# Patient Record
Sex: MALE | Employment: FULL TIME | URBAN - METROPOLITAN AREA
[De-identification: names, ages, dates, MRNs, and addresses within clinical notes are randomized per-mention and may not be internally consistent; named-entity substitution may affect disease eponyms.]

---

## 2017-04-19 ENCOUNTER — HOSPITAL ENCOUNTER (OUTPATIENT)
Dept: PHYSICAL THERAPY | Age: 62
Discharge: HOME | End: 2017-04-19
Payer: COMMERCIAL

## 2017-04-19 PROCEDURE — 97110 THERAPEUTIC EXERCISES: CPT

## 2017-04-19 PROCEDURE — 97162 PT EVAL MOD COMPLEX 30 MIN: CPT

## 2017-04-19 NOTE — PROGRESS NOTES
PT DAILY TREATMENT NOTE     Patient Name: Jensen Herzog. Date:2017  : 1955  [x]  Patient  Verified  Payor: BLUE CROSS / Plan: [employer] [facility] [place] / Product Type: PPO /    In time:10:05  Out time:***  Total Treatment Time (min): ***  Visit #: 1 of 8    Treatment Area: Low back pain [M54.5]    SUBJECTIVE  Pain Level (0-10 scale): 2/10  Any medication changes, allergies to medications, adverse drug reactions, diagnosis change, or new procedure performed?: [x] No    [] Yes (see summary sheet for update)  Subjective functional status/changes:   [] No changes reported  The patient has a chief complaint of lumbar pain that does extend into both hips. OBJECTIVE  *** min []Eval                  []Re-Eval       *** min Therapeutic Exercise:  [] See flow sheet :   Rationale: {BSHSI IMMOTION THER EX:01297} to improve the patients ability to ***    *** min Therapeutic Activity:  []  See flow sheet :   Rationale: {BSHSI IMMOTION THER EX:49533}  to improve the patients ability to ***     *** min Neuromuscular Re-education:  []  See flow sheet :   Rationale: {BSHSI IMMOTION THER EX:39626}  to improve the patients ability to ***    *** min Manual Therapy:  ***   Rationale: {BSHSI IMMOTION MANUAL THERAPY:37914} to ***    *** min Gait Training:  ___ feet with ___ device on level surfaces with ___ level of assist   Rationale:           With   [] TE   [] TA   [] neuro   [] other: Patient Education: [x] Review HEP    [] Progressed/Changed HEP based on:   [] positioning   [] body mechanics   [] transfers   [] heat/ice application    [] other:      Other Objective/Functional Measures: ***     Pain Level (0-10 scale) post treatment: ***    ASSESSMENT/Changes in Function: ***    Patient will continue to benefit from skilled PT services to modify and progress therapeutic interventions, address functional mobility deficits, address ROM deficits, address strength deficits, analyze and address soft tissue restrictions, analyze and cue movement patterns, analyze and modify body mechanics/ergonomics, assess and modify postural abnormalities and instruct in home and community integration to attain remaining goals. []  See Plan of Care  []  See progress note/recertification  []  See Discharge Summary         Progress towards goals / Updated goals:  Short Term Goals: To be accomplished in 2 weeks:  1. The patient will be independent and compliant with HEP to maximize therapeutic benefit. 2. The patient will improve 90/90 HS flexibility to 35 to reduce stress on lumbar spine. Long Term Goals: To be accomplished in 4 weeks:  1. The patient will improve FOTO score to 70 to maximize quality of life. 2. The patient will improve strength of hip ABD/EXT to 4+/5 MMT to maximize stability during standing. 3. The patient will display negative Silke Brazen test to reduce ambulation ease.   4. The patient display single leg bridge full range without hip drop for improved rotary stability.       PLAN  []  Upgrade activities as tolerated     []  Continue plan of care  []  Update interventions per flow sheet       []  Discharge due to:_  []  Other:_      Anaid Fitzpatrick, PT 4/19/2017  1:03 PM    Future Appointments  Date Time Provider Abrahan Maldonado   4/20/2017 10:00 AM August Bliss [facility]   4/25/2017 9:30 AM [facility]   4/27/2017 10:30 AM Shanika Enriquez [facility]   5/2/2017 8:30 AM [facility]   5/4/2017 9:30 AM [facility]   5/9/2017 9:00 AM [facility]patrick[facility]   5/11/2017 9:00 AM [facility]

## 2017-04-19 NOTE — PROGRESS NOTES
PT DAILY TREATMENT NOTE     Patient Name: Verito Snyder. Date:2017  : 1955  [x]  Patient  Verified  Payor: BLUE CROSS / Plan:  [facility] / Product Type: PPO /    In time:10:05  Out time:10:50  Total Treatment Time (min): 45  Visit #: 1 of 8    Treatment Area: Low back pain [M54.5]    SUBJECTIVE  Pain Level (0-10 scale): 2/10  Any medication changes, allergies to medications, adverse drug reactions, diagnosis change, or new procedure performed?: [x] No    [] Yes (see summary sheet for update)  Subjective functional status/changes:   [] No changes reported  The patient states that he has a chief complaint lumbar pain that limits standing ability.      OBJECTIVE    Modality rationale:  to improve the patients ability to    Min Type Additional Details    [] Estim:  []Unatt       []IFC  []Premod                        []Other:  []w/ice   []w/heat  Position:  Location:    [] Estim: []Att    []TENS instruct  []NMES                    []Other:  []w/US   []w/ice   []w/heat  Position:  Location:    []  Traction: [] Cervical       []Lumbar                       [] Prone          []Supine                       []Intermittent   []Continuous Lbs:  [] before manual  [] after manual    []  Ultrasound: []Continuous   [] Pulsed                           []1MHz   []3MHz W/cm2:  Location:    []  Iontophoresis with dexamethasone         Location: [] Take home patch   [] In clinic    []  Ice     []  heat  []  Ice massage  []  Laser   []  Anodyne Position:  Location:    []  Laser with stim  []  Other:  Position:  Location:    []  Vasopneumatic Device Pressure:       [] lo [] med [] hi   Temperature: [] lo [] med [] hi   [] Skin assessment post-treatment:  []intact []redness- no adverse reaction    []redness  adverse reaction:     35 min [x]Eval                  []Re-Eval       10 min Therapeutic Exercise:  [x] See flow sheet :   Rationale: increase ROM and increase strength to improve the patients ability to improve ADL ease. With   [] TE   [] TA   [] neuro   [] other: Patient Education: [x] Review HEP    [] Progressed/Changed HEP based on:   [] positioning   [] body mechanics   [] transfers   [] heat/ice application    [] other:      Other Objective/Functional Measures: See IE     Pain Level (0-10 scale) post treatment: 5/10    ASSESSMENT/Changes in Function: See POC. Patient will continue to benefit from skilled PT services to modify and progress therapeutic interventions, address functional mobility deficits, address ROM deficits, address strength deficits, analyze and address soft tissue restrictions, analyze and cue movement patterns, analyze and modify body mechanics/ergonomics, assess and modify postural abnormalities and instruct in home and community integration to attain remaining goals. [x]  See Plan of Care  []  See progress note/recertification  []  See Discharge Summary         Progress towards goals / Updated goals:  Short Term Goals: To be accomplished in 2 weeks:  1. The patient will be independent and compliant with HEP to maximize therapeutic benefit. 2. The patient will improve 90/90 HS flexibility to 35 to reduce stress on lumbar spine. Long Term Goals: To be accomplished in 4 weeks:  1. The patient will improve FOTO score to 70 to maximize quality of life. 2. The patient will improve strength of hip ABD/EXT to 4+/5 MMT to maximize stability during standing. 3. The patient will display negative Rika Espinoza test to reduce ambulation ease.   4. The patient display single leg bridge full range without hip drop for improved rotary stability.     PLAN  []  Upgrade activities as tolerated     [x]  Continue plan of care  []  Update interventions per flow sheet       []  Discharge due to:_  []  Other:_      Marianne Garcia PT 4/19/2017  1:19 PM    Future Appointments  Date Time Provider Abrahan Maldonado   4/20/2017 10:00 AM [address] [place] [address]   4/25/2017 9:30 AM Richard Will Linda, PT [facility]   4/27/2017 10:30 AM Lani Leedey [facility]   5/2/2017 8:30 AM Jasmin Mcfarlane, PT [facility]   5/4/2017 9:30 AM Lani Leedey [facility]   5/9/2017 9:00 AM Jasmin Mcfarlane PT [facility]   5/11/2017 9:00 AM Lani Leedey [facility]

## 2017-04-19 NOTE — PROGRESS NOTES
[facility]  [address]  [place], [address]  [phone] fax    Plan of Care/ Statement of Necessity for Physical Therapy Services    Patient name: Francoise Stokes. 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 The Plan of Care and following information is based on the information from the initial evaluation. Assessment/ key information: The patient is a 57year old male with a chief complaint of lumbar pain and B hip pain. He states the pain has been going on for about 6 months and that he has had a recent MRI about a month ago which showed lumbar stenosis. The patient states that his chief complaint limits him most with standing. The patient has signs and symptoms consistent with lumbar stenosis with associated impairments consisting of pain, decreased ROM, decreased flexibility, limited core stability, and decreased standing tolerance. The patient will benefit from skilled PT in order to address the above impairments.     Evaluation Complexity History MEDIUM  Complexity : 1-2 comorbidities / personal factors will impact the outcome/ POC ; Examination LOW Complexity : 1-2 Standardized tests and measures addressing body structure, function, activity limitation and / or participation in recreation  ;Presentation LOW Complexity : Stable, uncomplicated  ;Clinical Decision Making MEDIUM Complexity : FOTO score of 26-74  Overall Complexity Rating: MEDIUM  Problem List: pain affecting function, decrease ROM, decrease strength, decrease ADL/ functional abilitiies, decrease activity tolerance and decrease flexibility/ joint mobility   Treatment Plan may include any combination of the following: Therapeutic exercise, Therapeutic activities, Neuromuscular re-education, Physical agent/modality, Manual therapy and Patient education  Patient / Family readiness to learn indicated by: asking questions, trying to perform skills and interest  Persons(s) to be included in education: patient (P)  Barriers to Learning/Limitations: None  Patient Goal (s): lessen pain, better movement areas of lower back and hips  Patient Self Reported Health Status: good  Rehabilitation Potential: good    Short Term Goals: To be accomplished in 2 weeks:   1. The patient will be independent and compliant with HEP to maximize therapeutic benefit. 2. The patient will improve 90/90 HS flexibility to 35 to reduce stress on lumbar spine. Long Term Goals: To be accomplished in 4 weeks:   1. The patient will improve FOTO score to 70 to maximize quality of life. 2. The patient will improve strength of hip ABD/EXT to 4+/5 MMT to maximize stability during standing. 3. The patient will display negative Ricardo Sax test to reduce ambulation ease. 4. The patient display single leg bridge full range without hip drop for improved rotary stability. Frequency / Duration: Patient to be seen 2 times per week for 4 weeks. Patient/ Caregiver education and instruction: Diagnosis, prognosis, self care, activity modification and exercises   [x]  Plan of care has been reviewed with RY Montana, PT 4/19/2017 11:08 AM    ________________________________________________________________________    I certify that the above Therapy Services are being furnished while the patient is under my care. I agree with the treatment plan and certify that this therapy is necessary.     [de-identified] Signature:____________________  Date:____________Time: _________    Please sign and return to [facility] [address] [address] [address]  [place], [address]  [phone] fax

## 2017-04-20 ENCOUNTER — HOSPITAL ENCOUNTER (OUTPATIENT)
Dept: PHYSICAL THERAPY | Age: 62
Discharge: HOME | End: 2017-04-20
Payer: COMMERCIAL

## 2017-04-20 PROCEDURE — 97110 THERAPEUTIC EXERCISES: CPT

## 2017-04-20 PROCEDURE — 97112 NEUROMUSCULAR REEDUCATION: CPT

## 2017-04-20 NOTE — PROGRESS NOTES
PT DAILY TREATMENT NOTE     Patient Name: Lydia Lackey. Date:2017  : 1955  [x]  Patient  Verified  Payor: BLUE CROSS / Plan: [employer] [facility] / Product Type: PPO /    In time:10:00am  Out time:10:43am  Total Treatment Time (min): 43  Visit #: 2 of 8    Treatment Area: Low back pain [M54.5]    SUBJECTIVE  Pain Level (0-10 scale): 3  Any medication changes, allergies to medications, adverse drug reactions, diagnosis change, or new procedure performed?: [x] No    [] Yes (see summary sheet for update)  Subjective functional status/changes:   [] No changes reported  Pt reports he has yet to try his HEP, but will be performing today. OBJECTIVE    33 min Therapeutic Exercise:  [x] See flow sheet :   Rationale: increase ROM and increase strength to improve the patients ability to improve ADL ease. 10 min Neuromuscular Re-education:  [x]  See flow sheet :   Rationale: increase ROM, increase strength, improve coordination and increase proprioception  to improve the patients ability to improve gluteal activation, reduce hip flexor tone during activity, and reduce trunk extension compensatory movement to improve ADL ease and reduce stress on his low back during functional activity.           With   [] TE   [] TA   [] neuro   [] other: Patient Education: [x] Review HEP    [] Progressed/Changed HEP based on:   [] positioning   [] body mechanics   [] transfers   [] heat/ice application    [] other:      Other Objective/Functional Measures:     Mild limitations bilat with hip JEN, more limited with piriformis stretch on R    bilat mild limited hip flexor mobility    Cues for trunk stability during prone hip ER/IR and bridges  Requires cues to reduce trunk extensor dominance during bridging with fair carryover post cueing    Struggled with trunk stability with supine leg lowering, so performed supine SLR instead for today    Pain Level (0-10 scale) post treatment: 4    ASSESSMENT/Changes in Function: Pt requires cueing for trunk stability and demonstrates some limited hip and trunk mobility during interventions. Limited pain control with interventions today. Continue per POC. Patient will continue to benefit from skilled PT services to modify and progress therapeutic interventions, address functional mobility deficits, address ROM deficits, address strength deficits, analyze and address soft tissue restrictions, analyze and cue movement patterns, analyze and modify body mechanics/ergonomics, assess and modify postural abnormalities and instruct in home and community integration to attain remaining goals. []  See Plan of Care  []  See progress note/recertification  []  See Discharge Summary         Progress towards goals / Updated goals:  Short Term Goals: To be accomplished in 2 weeks:  1. The patient will be independent and compliant with HEP to maximize therapeutic benefit. Not met 4/20/2017  2. The patient will improve 90/90 HS flexibility to 35 to reduce stress on lumbar spine. Long Term Goals: To be accomplished in 4 weeks:  1. The patient will improve FOTO score to 70 to maximize quality of life. 2. The patient will improve strength of hip ABD/EXT to 4+/5 MMT to maximize stability during standing. 3. The patient will display negative Chales Schmitz test to reduce ambulation ease. 4. The patient display single leg bridge full range without hip drop for improved rotary stability.     PLAN  []  Upgrade activities as tolerated     []  Continue plan of care  []  Update interventions per flow sheet       []  Discharge due to:_  []  Other:_      Iman Davis 4/20/2017  10:15 AM

## 2017-04-25 ENCOUNTER — HOSPITAL ENCOUNTER (OUTPATIENT)
Dept: PHYSICAL THERAPY | Age: 62
Discharge: HOME | End: 2017-04-25
Payer: COMMERCIAL

## 2017-04-25 PROCEDURE — 97110 THERAPEUTIC EXERCISES: CPT

## 2017-04-25 PROCEDURE — 97112 NEUROMUSCULAR REEDUCATION: CPT

## 2017-04-25 NOTE — PROGRESS NOTES
PT DAILY TREATMENT NOTE 8-    Patient Name: Leslie Bernal. Date:2017  : 1955  [x]  Patient  Verified  Payor: BLUE CROSS / Plan: [employer] [facility] / Product Type: PPO /    In time: 9:30  Out time: 10:09  Total Treatment Time (min): 39  Visit #: 3 of 8    Treatment Area: Low back pain [M54.5]    SUBJECTIVE  Pain Level (0-10 scale): 0  Any medication changes, allergies to medications, adverse drug reactions, diagnosis change, or new procedure performed?: [x] No    [] Yes (see summary sheet for update)  Subjective functional status/changes:   [] No changes reported  \"Doing OK today\"    OBJECTIVE    29 min Therapeutic Exercise:  [x] See flow sheet :   Rationale: increase ROM and increase strength to improve the patients ability to improve ADL ease. 10 min Neuromuscular Re-education:  [x]  See flow sheet :   Rationale: increase ROM, increase strength, improve coordination and increase proprioception  to improve the patients ability to improve activity tolerance          With   [] TE   [] TA   [] neuro   [] other: Patient Education: [x] Review HEP    [] Progressed/Changed HEP based on:   [] positioning   [] body mechanics   [] transfers   [] heat/ice application    [] other:      Other Objective/Functional Measures: Limitations noted in right and left hip IR AROM (right>left) noted during prone hip IR/ER. Pain Level (0-10 scale) post treatment: 2-3 \"pain and stretch\"    ASSESSMENT/Changes in Function: Continues to have limitations with piriformis stretch right>left. Verbal cueing given for TA contraction and keeping his knees straight for SLR with TA brace (on each LE). Reported 2-3/10 pain post session stating that it is \"because it is stretched and worked. It is very tolerable\". Continue POC as tolerated.      Patient will continue to benefit from skilled PT services to modify and progress therapeutic interventions, address functional mobility deficits, address ROM deficits, address strength deficits, analyze and address soft tissue restrictions, analyze and cue movement patterns, analyze and modify body mechanics/ergonomics, assess and modify postural abnormalities and instruct in home and community integration to attain remaining goals. []  See Plan of Care  []  See progress note/recertification  []  See Discharge Summary         Progress towards goals / Updated goals:  Short Term Goals: To be accomplished in 2 weeks:  1. The patient will be independent and compliant with HEP to maximize therapeutic benefit. Not met 4/20/2017  2. The patient will improve 90/90 HS flexibility to 35 to reduce stress on lumbar spine. Long Term Goals: To be accomplished in 4 weeks:  1. The patient will improve FOTO score to 70 to maximize quality of life. 2. The patient will improve strength of hip ABD/EXT to 4+/5 MMT to maximize stability during standing. 3. The patient will display negative Patti Dajuan test to reduce ambulation ease. 4. The patient display single leg bridge full range without hip drop for improved rotary stability.     PLAN  [x]  Upgrade activities as tolerated     [x]  Continue plan of care  [x]  Update interventions per flow sheet       []  Discharge due to:_  []  Other:_      Lizeth Carrillo, PT 4/25/2017  9:45 AM

## 2017-04-27 ENCOUNTER — HOSPITAL ENCOUNTER (OUTPATIENT)
Dept: PHYSICAL THERAPY | Age: 62
Discharge: HOME | End: 2017-04-27
Payer: COMMERCIAL

## 2017-04-27 PROCEDURE — 97110 THERAPEUTIC EXERCISES: CPT

## 2017-04-27 PROCEDURE — 97112 NEUROMUSCULAR REEDUCATION: CPT

## 2017-04-27 NOTE — PROGRESS NOTES
PT DAILY TREATMENT NOTE 8-    Patient Name: Jayesh Connolly. Date:2017  : 1955  [x]  Patient  Verified  Payor: BLUE CROSS / Plan: [employer] [facility] [place] / Product Type: PPO /    In time:10:34am  Out time:11:16am  Total Treatment Time (min): 42  Visit #: 4 of 8    Treatment Area: Low back pain [M54.5]    SUBJECTIVE  Pain Level (0-10 scale): 1  Any medication changes, allergies to medications, adverse drug reactions, diagnosis change, or new procedure performed?: [x] No    [] Yes (see summary sheet for update)  Subjective functional status/changes:   [x] No changes reported    OBJECTIVE  32 min Therapeutic Exercise:  [x] See flow sheet :   Rationale: increase ROM and increase strength to improve the patients ability to improve ADL ease. 10 min Neuromuscular Re-education:  [x]  See flow sheet :   Rationale: increase ROM, increase strength, improve coordination and increase proprioception  to improve the patients ability to improve activity tolerance. With   [] TE   [] TA   [] neuro   [] other: Patient Education: [x] Review HEP    [] Progressed/Changed HEP based on:   [] positioning   [] body mechanics   [] transfers   [] heat/ice application    [] other:      Other Objective/Functional Measures:    Mild rotary instability with SL bridge worse on RLE than L that is more notable when fatigued     Pain Level (0-10 scale) post treatment: 3    ASSESSMENT/Changes in Function: Pt progressing into lunge progression, but requires frequent cues for trunk stability and to reduce ant weight shift excursion. Demonstrates continued limitations in hip mobility and core stability. Continue per POC.     Patient will continue to benefit from skilled PT services to modify and progress therapeutic interventions, address functional mobility deficits, address ROM deficits, address strength deficits, analyze and address soft tissue restrictions, analyze and cue movement patterns, analyze and modify body mechanics/ergonomics, assess and modify postural abnormalities and instruct in home and community integration to attain remaining goals. []  See Plan of Care  []  See progress note/recertification  []  See Discharge Summary         Progress towards goals / Updated goals:  Short Term Goals: To be accomplished in 2 weeks:  1. The patient will be independent and compliant with HEP to maximize therapeutic benefit. Not met 4/20/2017  2. The patient will improve 90/90 HS flexibility to 35 to reduce stress on lumbar spine. Grossly not met, but not formally measured 4/27/2017  Long Term Goals: To be accomplished in 4 weeks:  1. The patient will improve FOTO score to 70 to maximize quality of life. 2. The patient will improve strength of hip ABD/EXT to 4+/5 MMT to maximize stability during standing. 3. The patient will display negative Rika Espinoza test to reduce ambulation ease. 4. The patient display single leg bridge full range without hip drop for improved rotary stability.  Progressing, initiated SL bridges today with mild pelvic drop noted 4/27/2017    PLAN  [x]  Upgrade activities as tolerated     [x]  Continue plan of care  []  Update interventions per flow sheet       []  Discharge due to:_  []  Other:_      Guillaume Hockey, PT, DPT, ATC, CSCS 4/27/2017  10:42 AM

## 2017-05-02 ENCOUNTER — HOSPITAL ENCOUNTER (OUTPATIENT)
Dept: PHYSICAL THERAPY | Age: 62
Discharge: HOME | End: 2017-05-02
Payer: COMMERCIAL

## 2017-05-02 PROCEDURE — 97110 THERAPEUTIC EXERCISES: CPT

## 2017-05-02 PROCEDURE — 97112 NEUROMUSCULAR REEDUCATION: CPT

## 2017-05-02 NOTE — PROGRESS NOTES
PT DAILY TREATMENT NOTE - [facility]     Patient Name: Arlin Pink. Date:2017  : 1955  [x]  Patient  Verified  Payor: BLUE CROSS / Plan: [employer] [facility] / Product Type: PPO /    In time:8:33  Out time:9:10  Total Treatment Time (min): 37  Visit #: 5 of 8    Treatment Area: Low back pain [M54.5]    SUBJECTIVE  Pain Level (0-10 scale): 3/10  Any medication changes, allergies to medications, adverse drug reactions, diagnosis change, or new procedure performed?: [x] No    [] Yes (see summary sheet for update)  Subjective functional status/changes:   [x] No changes reported       OBJECTIVE  27 min Therapeutic Exercise:  [x] See flow sheet :   Rationale: increase ROM and increase strength to improve the patients ability to improve ADL ease. 10 min Neuromuscular Re-education:  [x]  See flow sheet : brenda lungmoe, glute activation. Rationale: improve coordination, improve balance and increase proprioception  to improve the patients ability to improve ADL ease. With   [] TE   [] TA   [] neuro   [] other: Patient Education: [x] Review HEP    [] Progressed/Changed HEP based on:   [] positioning   [] body mechanics   [] transfers   [] heat/ice application    [] other:      Other Objective/Functional Measures:   HS flexibility: 30 degrees bilaterally    Noted improvement with hip ROM IR/ER    Rec femoris remains positive for Ely test.    Pain Level (0-10 scale) post treatment: 3/10    ASSESSMENT/Changes in Function: Progressing well with hip mobility as well as hamstring flexibility. Pain levels remain high. Pt requires constant cues during stability and squatting in order to display proper form.      Patient will continue to benefit from skilled PT services to modify and progress therapeutic interventions, address functional mobility deficits, address ROM deficits, address strength deficits, analyze and address soft tissue restrictions, analyze and cue movement patterns, analyze and modify body mechanics/ergonomics, assess and modify postural abnormalities and instruct in home and community integration to attain remaining goals. []  See Plan of Care  []  See progress note/recertification  []  See Discharge Summary         Progress towards goals / Updated goals:  Short Term Goals: To be accomplished in 2 weeks:  1. The patient will be independent and compliant with HEP to maximize therapeutic benefit. Not met 4/20/2017  2. The patient will improve 90/90 HS flexibility to 35 to reduce stress on lumbar spine. Grossly not met, but not formally measured 4/27/2017; Met 30 degrees 5/02/2017. Long Term Goals: To be accomplished in 4 weeks:  1. The patient will improve FOTO score to 70 to maximize quality of life. 2. The patient will improve strength of hip ABD/EXT to 4+/5 MMT to maximize stability during standing. 3. The patient will display negative Fareed Lapping test to reduce ambulation ease. Remains positive 5/02/2017. 4. The patient display single leg bridge full range without hip drop for improved rotary stability.  Progressing, initiated SL bridges today with mild pelvic drop noted 4/27/2017       PLAN  []  Upgrade activities as tolerated     []  Continue plan of care  []  Update interventions per flow sheet       []  Discharge due to:_  []  Other:_      Mimi Martins PT 5/2/2017  8:44 AM    Future Appointments  Date Time Provider Abrahan Maldonado   5/4/2017 9:30 AM [address] [place] [address]   5/9/2017 9:00 AM Mimi Martins PT [facility]   5/11/2017 9:00 AM Florentin Maurer [facility]

## 2017-05-04 ENCOUNTER — APPOINTMENT (OUTPATIENT)
Dept: PHYSICAL THERAPY | Age: 62
End: 2017-05-04
Payer: COMMERCIAL

## 2017-05-09 ENCOUNTER — HOSPITAL ENCOUNTER (OUTPATIENT)
Dept: PHYSICAL THERAPY | Age: 62
Discharge: HOME | End: 2017-05-09
Payer: COMMERCIAL

## 2017-05-09 PROCEDURE — 97110 THERAPEUTIC EXERCISES: CPT

## 2017-05-09 PROCEDURE — 97112 NEUROMUSCULAR REEDUCATION: CPT

## 2017-05-09 NOTE — PROGRESS NOTES
PT DAILY TREATMENT NOTE 3-16    Patient Name: Blake Goodpasture. Date:2017  : 1955  [x]  Patient  Verified  Payor: BLUE CROSS / Plan: Nadya Garcia / Product Type: PPO /    In time:9:04  Out time:9:38  Total Treatment Time (min): 34  Visit #: 6 of 8    Treatment Area: Low back pain [M54.5]    SUBJECTIVE  Pain Level (0-10 scale): 2  Any medication changes, allergies to medications, adverse drug reactions, diagnosis change, or new procedure performed?: [x] No    [] Yes (see summary sheet for update)  Subjective functional status/changes:   [] No changes reported  \"It's doing Ok, I guess. \"    OBJECTIVE  24 min Therapeutic Exercise:  [x] See flow sheet :   Rationale: increase ROM, increase strength and improve coordination to improve the patients ability to improve ADL ease    10 min Neuromuscular Re-education:  [x]  See flow sheet :   Rationale: increase strength, improve coordination and increase proprioception  to improve the patients ability to improve ADL ease            With   [] TE   [] TA   [] neuro   [] other: Patient Education: [x] Review HEP    [] Progressed/Changed HEP based on:   [] positioning   [] body mechanics   [] transfers   [] heat/ice application    [] other:      Other Objective/Functional Measures:   Cues to reach terminal range with bridges   Patient is challenged with keizer lunge coordination, despite maximal therapist cueing, patient reports \"I can't do this\" and declines to do exercise    Pain Level (0-10 scale) post treatment: 3    ASSESSMENT/Changes in Function: Patient requires frequent cueing for controlled reps as patient rushes through each exercise. Will assess FOTO next visit.      Patient will continue to benefit from skilled PT services to modify and progress therapeutic interventions, address functional mobility deficits, address ROM deficits, address strength deficits, analyze and address soft tissue restrictions, analyze and cue movement patterns, analyze and modify body mechanics/ergonomics and assess and modify postural abnormalities to attain remaining goals. []  See Plan of Care  []  See progress note/recertification  []  See Discharge Summary         Progress towards goals / Updated goals:  Short Term Goals: To be accomplished in 2 weeks:  1. The patient will be independent and compliant with HEP to maximize therapeutic benefit. Not met 4/20/2017  2. The patient will improve 90/90 HS flexibility to 35 to reduce stress on lumbar spine. Grossly not met, but not formally measured 4/27/2017; Met 30 degrees 5/02/2017. Long Term Goals: To be accomplished in 4 weeks:  1. The patient will improve FOTO score to 70 to maximize quality of life. 2. The patient will improve strength of hip ABD/EXT to 4+/5 MMT to maximize stability during standing. 3. The patient will display negative Sandy Beath test to reduce ambulation ease. Remains positive 5/02/2017. 4. The patient display single leg bridge full range without hip drop for improved rotary stability.  Progressing, initiated SL bridges today with mild pelvic drop noted 4/27/2017    PLAN  []  Upgrade activities as tolerated     [x]  Continue plan of care  []  Update interventions per flow sheet       []  Discharge due to:_  []  Other:_      Renée Jiménez 5/9/2017  7:36 AM    Future Appointments  Date Time Provider Abrahan Maldonado   5/9/2017 9:00 AM Carrie Calvin [address] [facility]   5/11/2017 9:00 AM Shashi Stake [id number]

## 2017-05-11 ENCOUNTER — HOSPITAL ENCOUNTER (OUTPATIENT)
Dept: PHYSICAL THERAPY | Age: 62
Discharge: HOME | End: 2017-05-11
Payer: COMMERCIAL

## 2017-05-11 PROCEDURE — 97110 THERAPEUTIC EXERCISES: CPT

## 2017-05-11 PROCEDURE — 97112 NEUROMUSCULAR REEDUCATION: CPT

## 2017-05-11 NOTE — PROGRESS NOTES
PT DAILY TREATMENT NOTE 3-16    Patient Name: Benitez Garvey. Date:2017  : 1955  [x]  Patient  Verified  Payor: BLUE CROSS / Plan: [facility] [place] / Product Type: PPO /    In time: 9:00  Out time:9:36  Total Treatment Time (min): 36  Visit #: 7 of 8    Treatment Area: Low back pain [M54.5]    SUBJECTIVE  Pain Level (0-10 scale): 2  Any medication changes, allergies to medications, adverse drug reactions, diagnosis change, or new procedure performed?: [x] No    [] Yes (see summary sheet for update)  Subjective functional status/changes:   [] No changes reported  \"i was practicing the lunges at home, I still can't do them, I'm too uncoordinated. \"    OBJECTIVE  26 min Therapeutic Exercise:  [x] See flow sheet :   Rationale: increase ROM, increase strength and improve coordination to improve the patients ability to perform ADLs    10 min Neuromuscular Re-education:  [x]  See flow sheet :   Rationale: increase strength, improve coordination, improve balance and increase proprioception  to improve the patients ability to improve ADL ease            With   [] TE   [] TA   [] neuro   [] other: Patient Education: [x] Review HEP    [] Progressed/Changed HEP based on:   [] positioning   [] body mechanics   [] transfers   [] heat/ice application    [] other:      Other Objective/Functional Measures:   FOTO score of 66   Patient challenged with SL bridges, cues to reach terminal range  Added 2# for SLR, cues to maintain knee extension on left    Pain Level (0-10 scale) post treatment: 2-3    ASSESSMENT/Changes in Function: Patient continues to demonstrate rushed and uncontrolled reps. FOTO score of 66.      Patient will continue to benefit from skilled PT services to modify and progress therapeutic interventions, address functional mobility deficits, address ROM deficits, address strength deficits, analyze and address soft tissue restrictions, analyze and cue movement patterns, analyze and modify body mechanics/ergonomics and assess and modify postural abnormalities to attain remaining goals. []  See Plan of Care  []  See progress note/recertification  []  See Discharge Summary         Progress towards goals / Updated goals:  Short Term Goals: To be accomplished in 2 weeks:  1. The patient will be independent and compliant with HEP to maximize therapeutic benefit. Not met 4/20/2017  2. The patient will improve 90/90 HS flexibility to 35 to reduce stress on lumbar spine. Grossly not met, but not formally measured 4/27/2017; Met 30 degrees 5/02/2017. Long Term Goals: To be accomplished in 4 weeks:  1. The patient will improve FOTO score to 70 to maximize quality of life. - progressing,  score of 66 on 5/11/2017  2. The patient will improve strength of hip ABD/EXT to 4+/5 MMT to maximize stability during standing. 3. The patient will display negative [place] Huguenin test to reduce ambulation ease. Remains positive 5/02/2017. 4. The patient display single leg bridge full range without hip drop for improved rotary stability.  Progressing, initiated SL bridges today with mild pelvic drop noted 4/27/2017    PLAN  []  Upgrade activities as tolerated     [x]  Continue plan of care  []  Update interventions per flow sheet       []  Discharge due to:_  []  Other:_      Radha Casper 5/11/2017  7:16 AM    Future Appointments  Date Time Provider Abrahan Maldonado   5/11/2017 9:00 AM Radha Casper [id number]

## 2017-06-08 NOTE — PROGRESS NOTES
[facility]  [address]  [place], [address]  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 Visits from Start of Care: 7    Missed Visits: 0  Reporting Period : 2017 to 2017    Summary of Care:  Short Term Goals: To be accomplished in 2 weeks:  1. The patient will be independent and compliant with HEP to maximize therapeutic benefit. Not met 2017  2. The patient will improve 90/90 HS flexibility to 35 to reduce stress on lumbar spine. Grossly not met, but not formally measured 2017; Met 30 degrees 2017. Long Term Goals: To be accomplished in 4 weeks:  1. The patient will improve FOTO score to 70 to maximize quality of life. - progressing,  score of 66 on 2017  2. The patient will improve strength of hip ABD/EXT to 4+/5 MMT to maximize stability during standing. 3. The patient will display negative Ricardo Sax test to reduce ambulation ease. Remains positive 2017. 4. The patient display single leg bridge full range without hip drop for improved rotary stability. Progressing, initiated SL bridges today with mild pelvic drop noted 2017     ASSESSMENT/RECOMMENDATIONS: The patient has been discharged to continue HEP. Made some progress towards goals, no change with regards to pain level.      [x]Discontinue therapy: []Patient has reached or is progressing toward set goals      []Patient is non-compliant or has abdicated      [x]Due to lack of appreciable progress towards set [address], PT 6/8/2017 6:13 PM

## 2018-08-05 PROBLEM — H25.012 CORTICAL AGE-RELATED CATARACT OF LEFT EYE: Status: ACTIVE | Noted: 2018-08-05

## 2018-08-07 PROBLEM — H25.012 CORTICAL AGE-RELATED CATARACT OF LEFT EYE: Status: RESOLVED | Noted: 2018-08-05 | Resolved: 2018-08-07

## 2019-07-01 ENCOUNTER — ANESTHESIA EVENT (OUTPATIENT)
Dept: ENDOSCOPY | Age: 64
End: 2019-07-01
Payer: COMMERCIAL

## 2019-07-01 RX ORDER — ERGOCALCIFEROL 1.25 MG/1
50000 CAPSULE ORAL
Status: ON HOLD | COMMUNITY
Start: 2019-05-21 | End: 2022-01-26

## 2019-07-01 RX ORDER — ATORVASTATIN CALCIUM 40 MG/1
40 TABLET, FILM COATED ORAL
COMMUNITY
Start: 2019-05-21

## 2019-07-01 NOTE — PERIOP NOTES
PAT - SURGICAL PRE-ADMISSION INSTRUCTIONS    NAME:  Mayra Caro TODAY'S DATE:  7/1/2019    SURGERY DATE:  7/2/2019                                  SURGERY ARRIVAL TIME:   0930    1. Do NOT eat or drink anything, including candy or gum, after MIDNIGHT on 07/01/2019 , unless you have specific instructions from your Surgeon or Anesthesia Provider to do so. 2. No smoking on the day of surgery. 3. No alcohol 24 hours prior to the day of surgery. 4. No recreational drugs for one week prior to the day of surgery. 5. Leave all valuables, including money/purse, at home. 6. Remove all jewelry, nail polish, makeup (including mascara); no lotions, powders, deodorant, or perfume/cologne/after shave. 7. Glasses/Contact lenses and Dentures may be worn to the hospital.  They will be removed prior to surgery. 8. Call your doctor if symptoms of a cold or illness develop within 24 ours prior to surgery. 9. AN ADULT MUST DRIVE YOU HOME AFTER OUTPATIENT SURGERY. 10. If you are having an OUTPATIENT procedure, please make arrangements for a responsible adult to be with you for 24 hours after your surgery. 11. If you are admitted to the hospital, you will be assigned to a bed after surgery is complete. Normally a family member will not be able to see you until you are in your assigned bed. 15. Family is encouraged to accompany you to the hospital.  We ask visitors in the treatment area to be limited to ONE person at a time to ensure patient privacy. EXCEPTIONS WILL BE MADE AS NEEDED. 15. Children under 12 are discouraged from entering the treatment area and need to be supervised by an adult when in the waiting room. Special Instructions:    HOLD oral diabetic medication on the MORNING OF surgery. , HOLD metformin/glucophage dose starting the EVENING BEFORE the day of surgery. , Complete bowel prep per MD instructions.             These surgical instructions were reviewed with Sydnie Stinson during the PAT phone call. Directions: On the morning of surgery, please go to the [facility]. Enter the building from the [facility] entrance, 1st floor (next to the Emergency Room entrance). Take the elevator to the 2nd floor. Sign in at the Registration Desk.     If you have any questions and/or concerns, please do not hesitate to call:  (Prior to the day of surgery)  [facility] unit:  [phone]  (Day of surgery)  [facility] unit:  [phone]

## 2019-07-02 ENCOUNTER — ANESTHESIA (OUTPATIENT)
Dept: ENDOSCOPY | Age: 64
End: 2019-07-02
Payer: COMMERCIAL

## 2019-07-02 ENCOUNTER — HOSPITAL ENCOUNTER (OUTPATIENT)
Age: 64
Setting detail: OUTPATIENT SURGERY
Discharge: HOME | End: 2019-07-02
Attending: INTERNAL MEDICINE | Admitting: INTERNAL MEDICINE
Payer: COMMERCIAL

## 2019-07-02 VITALS
RESPIRATION RATE: 19 BRPM | HEIGHT: 74 IN | DIASTOLIC BLOOD PRESSURE: 101 MMHG | HEART RATE: 74 BPM | BODY MASS INDEX: 28.68 KG/M2 | OXYGEN SATURATION: 100 % | WEIGHT: 223.44 LBS | SYSTOLIC BLOOD PRESSURE: 149 MMHG | TEMPERATURE: 98.7 F

## 2019-07-02 PROBLEM — Z86.010 HISTORY OF COLON POLYPS: Status: ACTIVE | Noted: 2019-07-02

## 2019-07-02 LAB
GLUCOSE BLD STRIP.AUTO-MCNC: 101 MG/DL (ref 70–110)
GLUCOSE BLD STRIP.AUTO-MCNC: 95 MG/DL (ref 70–110)

## 2019-07-02 PROCEDURE — 76040000019: Performed by: INTERNAL MEDICINE

## 2019-07-02 PROCEDURE — 76060000031 HC ANESTHESIA FIRST 0.5 HR: Performed by: INTERNAL MEDICINE

## 2019-07-02 PROCEDURE — 77030031670 HC DEV INFL ENDOTEK BIG60 MRTM -B: Performed by: INTERNAL MEDICINE

## 2019-07-02 PROCEDURE — 74011250636 HC RX REV CODE- 250/636

## 2019-07-02 PROCEDURE — 74011250636 HC RX REV CODE- 250/636: Performed by: NURSE ANESTHETIST, CERTIFIED REGISTERED

## 2019-07-02 PROCEDURE — 82962 GLUCOSE BLOOD TEST: CPT

## 2019-07-02 RX ORDER — MAGNESIUM SULFATE 100 %
4 CRYSTALS MISCELLANEOUS AS NEEDED
Status: DISCONTINUED | OUTPATIENT
Start: 2019-07-02 | End: 2019-07-02 | Stop reason: HOSPADM

## 2019-07-02 RX ORDER — PROPOFOL 10 MG/ML
INJECTION, EMULSION INTRAVENOUS AS NEEDED
Status: DISCONTINUED | OUTPATIENT
Start: 2019-07-02 | End: 2019-07-02 | Stop reason: HOSPADM

## 2019-07-02 RX ORDER — LIDOCAINE HYDROCHLORIDE 10 MG/ML
0.1 INJECTION, SOLUTION EPIDURAL; INFILTRATION; INTRACAUDAL; PERINEURAL AS NEEDED
Status: DISCONTINUED | OUTPATIENT
Start: 2019-07-02 | End: 2019-07-02 | Stop reason: HOSPADM

## 2019-07-02 RX ORDER — SODIUM CHLORIDE, SODIUM LACTATE, POTASSIUM CHLORIDE, CALCIUM CHLORIDE 600; 310; 30; 20 MG/100ML; MG/100ML; MG/100ML; MG/100ML
50 INJECTION, SOLUTION INTRAVENOUS CONTINUOUS
Status: DISCONTINUED | OUTPATIENT
Start: 2019-07-02 | End: 2019-07-02 | Stop reason: HOSPADM

## 2019-07-02 RX ORDER — INSULIN LISPRO 100 [IU]/ML
INJECTION, SOLUTION INTRAVENOUS; SUBCUTANEOUS ONCE
Status: DISCONTINUED | OUTPATIENT
Start: 2019-07-02 | End: 2019-07-02 | Stop reason: HOSPADM

## 2019-07-02 RX ORDER — FAMOTIDINE 20 MG/1
20 TABLET, FILM COATED ORAL ONCE
Status: DISCONTINUED | OUTPATIENT
Start: 2019-07-02 | End: 2019-07-02 | Stop reason: HOSPADM

## 2019-07-02 RX ORDER — LIDOCAINE HYDROCHLORIDE 20 MG/ML
INJECTION, SOLUTION EPIDURAL; INFILTRATION; INTRACAUDAL; PERINEURAL AS NEEDED
Status: DISCONTINUED | OUTPATIENT
Start: 2019-07-02 | End: 2019-07-02 | Stop reason: HOSPADM

## 2019-07-02 RX ADMIN — PROPOFOL 150 MG: 10 INJECTION, EMULSION INTRAVENOUS at 11:43

## 2019-07-02 RX ADMIN — LIDOCAINE HYDROCHLORIDE 60 MG: 20 INJECTION, SOLUTION EPIDURAL; INFILTRATION; INTRACAUDAL; PERINEURAL at 11:43

## 2019-07-02 RX ADMIN — SODIUM CHLORIDE, SODIUM LACTATE, POTASSIUM CHLORIDE, AND CALCIUM CHLORIDE 50 ML/HR: 600; 310; 30; 20 INJECTION, SOLUTION INTRAVENOUS at 11:18

## 2019-07-02 NOTE — DISCHARGE INSTRUCTIONS
For the next 12 hours you should not:   1. drive   2. drink alcohol   3. operate any machinery   4. engage in activities that require mental sharpness or manual dexterity    5. take any drugs other than those prescribed by a physician   6. make any legal or financial decisions    Call your doctor's office immediately, if:   1. Severe rectal bleeding   2. High fever   3. Severe abdominal pain    Take it easy today and resume normal activity tomorrow. Resume previous diet. Juan Waller MD, FACP         DISCHARGE SUMMARY from Nurse    PATIENT INSTRUCTIONS:    After general anesthesia or intravenous sedation, for 24 hours or while taking prescription Narcotics:  · Limit your activities  · Do not drive and operate hazardous machinery  · Do not make important personal or business decisions  · Do  not drink alcoholic beverages  · If you have not urinated within 8 hours after discharge, please contact your surgeon on call. Report the following to your surgeon:  · Excessive pain, swelling, redness or odor of or around the surgical area  · Temperature over 100.5  · Nausea and vomiting lasting longer than 4 hours or if unable to take medications  · Any signs of decreased circulation or nerve impairment to extremity: change in color, persistent  numbness, tingling, coldness or increase pain  · Any questions    What to do at Home:  No smoking/ No tobacco products/ Avoid exposure to second hand smoke  Surgeon General's Warning:  Quitting smoking now greatly reduces serious risk to your health.     Obesity, smoking, and sedentary lifestyle greatly increases your risk for illness    A healthy diet, regular physical exercise & weight monitoring are important for maintaining a healthy lifestyle    You may be retaining fluid if you have a history of heart failure or if you experience any of the following symptoms:  Weight gain of 3 pounds or more overnight or 5 pounds in a week, increased swelling in our hands or feet or shortness of breath while lying flat in bed. Please call your doctor as soon as you notice any of these symptoms; do not wait until your next office visit. The discharge information has been reviewed with the patient. The patient verbalized understanding. Discharge medications reviewed with the patient and appropriate educational materials and side effects teaching were provided. Patient armband removed and given to patient to take home.   Patient was informed of the privacy risks if armband lost or stolen

## 2019-07-02 NOTE — PERIOP NOTES
Pre-Op Summary    Pt arrived via car with family/friend and is oriented to time, place, person and situation. Patient with steady gait with none assistive devices. Visit Vitals  /81   Pulse 92   Temp 98.7 °F (37.1 °C)   Resp 16   Ht 6' 2\" (1.88 m)   Wt 101.4 kg (223 lb 7 oz)   SpO2 100%   BMI 28.69 kg/m²       Peripheral IV located on Right hand . Patients belongings are located with wife. Patient's point of contact is [employer], wife and their contact number is: NA. They will be in the waiting room. They are able to receive medication information. They will be their ride home.

## 2019-07-02 NOTE — PROCEDURES
Colonoscopy Report     Date of Procedure:2019       Patient: Fifi Chapa. Date of Birth: @     MRN: [id number]   Age: 75 y.o.   SSN: [id number]  Sex: male            FINDINGS & IMPRESSION:  1. No polyps or mass. 2. Mild sigmoid diverticulosis. 3. Small hemorrhoids. RECOMMENDATIONS  1. Resume all home medications and diet. 2. Repeat colonoscopy in 10 years. 3. Follow up with me prn and return to the care of Dr. Savita Flores. Indication:  Personal history of colon polyps (screening only)  Procedure Performed: Colonoscopy   Endoscopist: Kobi Ivy MD  Assistant: Endoscopy Technician-1: Humberto Rios  Endoscopy RN-1: Pascual Licea RN  ASA: ASA 3 - Patient with moderate systemic disease with functional limitations  Mallampati Score: II (soft palate, uvula, fauces visible)  Anesthesia: MAC anesthesia  Endoscope: [id number]  Extent of Examination:cecum, which was identified by the ileocecal valve and appendiceal orifice  Quality of Preparation:     [x]  Excellent   []  Very Good   [] Fair but adequate   [] Fair, inadequate   []  Poor      Technique: Informed consent was obtained. A safety timeout was performed. The patient was placed in left lateral position. A perianal inspection and a digital rectal exam were performed. The patients vital signs were monitored at all times including heart rate, rhythm, blood pressure and oxygen saturation. Sedation/anesthesia was administered. When adequate sedation was achieved the video colonoscope was introduced into the rectum and advanced under direct vision up to the cecum, which was identified by the ileocecal valve and appendiceal orifice. The terminal ileum was not intubated. With adequate insufflation and maneuvering of the withdrawing scope, the colonic mucosa was visualized carefully. Retroflexion was performed in the rectum and the distal rectum visualized.   The patient tolerated the procedure very well and was transferred to recovery area. EBL:   Mild    Complications:  None. Specimen: * No specimens in log *    Prosthetic devices or implants:  None. Juan Gutiérrez MD, [address]  [facility]  July 2, 2019  11:56 AM

## 2019-07-02 NOTE — H&P
Date of Surgery Update:  Matoritoharis Venkata. was seen and examined. History and physical has been reviewed. The patient has been examined.  There have been no significant clinical changes since the completion of the originally dated History and Physical.    Signed By: Heydi Arredondo MD     July 2, 2019 11:06 AM

## 2019-07-02 NOTE — ANESTHESIA PREPROCEDURE EVALUATION
Relevant Problems   No relevant active problems       Anesthetic History   No history of anesthetic complications            Review of Systems / Medical History  Patient summary reviewed, nursing notes reviewed and pertinent labs reviewed    Pulmonary        Sleep apnea: CPAP           Neuro/Psych   Within defined limits           Cardiovascular  Within defined limits                     GI/Hepatic/Renal  Within defined limits              Endo/Other    Diabetes    Arthritis     Other Findings              Physical Exam    Airway  Mallampati: II  TM Distance: 4 - 6 cm  Neck ROM: decreased range of motion   Mouth opening: Normal     Cardiovascular  Regular rate and rhythm,  S1 and S2 normal,  no murmur, click, rub, or gallop             Dental    Dentition: Poor dentition     Pulmonary  Breath sounds clear to auscultation               Abdominal  GI exam deferred       Other Findings            Anesthetic Plan    ASA: 3  Anesthesia type: MAC          Induction: Intravenous  Anesthetic plan and risks discussed with: Patient

## 2019-07-02 NOTE — ROUTINE PROCESS
Patient taken to recovery by this RN and CRNA, bedside report given to Tracie Armas RN. Patient stable and on monitor.

## 2019-07-02 NOTE — ANESTHESIA POSTPROCEDURE EVALUATION
Post-Anesthesia Evaluation & Assessment    Visit Vitals  /70   Pulse 78   Temp 37.1 °C (98.7 °F)   Resp 20   Ht 6' 2\" (1.88 m)   Wt 101.4 kg (223 lb 7 oz)   SpO2 98%   BMI 28.69 kg/m²       Nausea/Vomiting: no nausea and no vomiting    Pain score (VAS): 0    Post-operative hydration adequate. Mental status & Level of consciousness: orientation per pre-anesthetic level    Neurological status: moves all extremities, sensation grossly intact    Pulmonary status: airway patent, no supplemental oxygen required    Complications related to anesthesia: none    Additional comments:        Julia Roa CRNA  July 2, 2019  Procedure(s):  COLONOSCOPY.     MAC    <BSHSIANPOST>    Vitals Value Taken Time   /70 7/2/2019 11:55 AM   Temp     Pulse 78 7/2/2019 11:55 AM   Resp 20 7/2/2019 11:55 AM   SpO2 98 % 7/2/2019 11:55 AM

## 2021-05-05 ENCOUNTER — HOSPITAL ENCOUNTER (OUTPATIENT)
Dept: PHYSICAL THERAPY | Age: 66
Discharge: HOME | End: 2021-05-05
Payer: COMMERCIAL

## 2021-05-05 PROCEDURE — 97140 MANUAL THERAPY 1/> REGIONS: CPT

## 2021-05-05 PROCEDURE — 97162 PT EVAL MOD COMPLEX 30 MIN: CPT

## 2021-05-05 PROCEDURE — 97110 THERAPEUTIC EXERCISES: CPT

## 2021-05-05 PROCEDURE — 97535 SELF CARE MNGMENT TRAINING: CPT

## 2021-05-05 NOTE — PROGRESS NOTES
PT DAILY TREATMENT NOTE     Patient Name: Jovon Cyr. Date:2021  : 1955  [x]  Patient  Verified  Payor: BLUE CROSS / Plan: [employer] [facility] / Product Type: PPO /    In time:517  Out time:605  Total Treatment Time (min): 48  Visit #: 1 of 8    Medicare/BCBS Only   Total Timed Codes (min):  38 1:1 Treatment Time:  48       Treatment Area: Left foot pain [M79.672]  Low back pain [M54.5]  Pain in right foot [M79.671]    SUBJECTIVE  Pain Level (0-10 scale): 6  Any medication changes, allergies to medications, adverse drug reactions, diagnosis change, or new procedure performed?: [x] No    [] Yes (see summary sheet for update)  Subjective functional status/changes:   [] No changes reported  See POC and paper evaluation. OBJECTIVE    10 min [x]Eval                  []Re-Eval        15 min Therapeutic Exercise:  [] See flow sheet : HEP education   Rationale: increase ROM, increase strength and improve coordination to improve the patients ability to manage ADLs. 15 min Self Care/Home management:  []  See flow sheet : Discussed relevant anatomy and pathology as it relates to self care. Advised pt to establish routine of daily stretching to reduce low back pain. Rationale: decrease pain and increase understanding  to improve the patients ability to manage self care    8 min Manual Therapy:  Pt prone -- lumbar PA's GR2, STM left lumbar paraspinals and QL; reclined long sit -- talocrural and subtalar GR2 mobs all directions   The manual therapy interventions were performed at a separate and distinct time from the therapeutic activities interventions. Rationale: decrease pain, increase ROM and increase tissue extensibility to improve ease and comfort with ambulation.             With   [] TE   [] TA   [] neuro   [] other: Patient Education: [x] Review HEP    [] Progressed/Changed HEP based on:   [] positioning   [] body mechanics   [] transfers   [] heat/ice application    [] other: Other Objective/Functional Measures: see POC/paper evaluation     Pain Level (0-10 scale) post treatment: 6    ASSESSMENT/Changes in Function: Pt is a 49-year-old male presenting to the clinic today with c/o left sided lateral hip pain that radiates to the left side of the low back as well as B foot pain. Back and lateral hip pain began 6-8 months ago insidiously,with pt stating \"my spine feels like it's twisted. \" Foot pain has been ongoing since the 1980s. Pt is currently having pain with prolonged standing and sitting and presents with limited lumbar flexion and extension, decreased B LE strength, decreased flexibility of the B LE, decreased B ankle and foot mobility, and decreased ease of transfers, bed mobility, and ambulation. He presents with increased tightness and tenderness to palpation of the left sided QL and lumbar paraspinals and the left ITB. (-) SIJ testing. Decreased thoracolumbar mobility noted in prone. Signs and symptoms are consistent with mechanical low back pain and foot pain increased by structural weakness and reduced mobility. Mr. Charly Ahuja will benefit from physical therapy services to address the aforementioned impairments and progress towards goal attainment. Patient will continue to benefit from skilled PT services to modify and progress therapeutic interventions, address functional mobility deficits, address ROM deficits, address strength deficits, analyze and address soft tissue restrictions, analyze and cue movement patterns, analyze and modify body mechanics/ergonomics, assess and modify postural abnormalities, address imbalance/dizziness and instruct in home and community integration to attain remaining goals. [x]  See Plan of Care  []  See progress note/recertification  []  See Discharge Summary         Progress towards goals / Updated goals:  Short Term Goals: To be accomplished in 2 weeks:  1.  Pt will report daily compliance with his HEP to maximize therapeutic success. Eval: HEP assigned  2. Pt will perform STS from standard 18-21 inch chair with or without UE assist without pain increase to improve ease of completing functional transfers. Eval: BUE use with increased pain getting up from pt evaluation chair  Long Term Goals: To be accomplished in 4 weeks:  1. Pt will improve his FOTO score to 67, demonstrating improved functional capacity for ADLs. Eval: 54  2. Pt will improve lumbar extension to 50% or better of WNL to improve ease of performing self care tasks. Eval: <25% with stiffness and pain  3. Pt will improve glute medius and jewell strength B to 4/5 MMT or better to improve ease of prolonged standing and ambulation. Eval: glute med 4-/5 B, glute max right LE 3+/5 left LE 3-/5  4. Pt will improve B foot strength to 4/5 MMT or better to improve ease of standing and performing STS transfers. Eval: anterior tibialis right 4/5 left 4-/5, peroneals/invertors 4-/5 right 3+/5 left, plantarflexors 4/5 right 3+/5 left  5. Pt will demonstrate negative JEN testing B, indicating improved flexibility and ease for donning and doffing shoes.     Eval: JEN (+) B for left sided LBP    PLAN  []  Upgrade activities as tolerated     [x]  Continue plan of care  []  Update interventions per flow sheet       []  Discharge due to:_  []  Other:_      Shawn Bear, PT 5/5/2021  6:14 PM    Future Appointments   Date Time Provider Abrahan Maldonado   5/17/2021  6:00 PM Prescott Muster, PTA [facility]   5/19/2021  6:00 PM Nithya Holden, PTA [facility]   5/24/2021  5:15 PM Prescott Muster, PTA [facility]   5/26/2021  6:00 PM Nithya Holden, PTA [facility]   6/2/2021  5:15 PM Dre Deng, PT [facility]   6/3/2021  5:15 PM Jayme Strong, PTA [facility]   6/8/2021  5:15 PM Nithya Holden, PTA [facility]

## 2021-05-05 NOTE — PROGRESS NOTES
[facility]  [address]  [place], [address]  [phone] fax    Plan of Care/ Statement of Necessity for Physical Therapy Services    Patient name: Chi Orozco. Start of Care: 2021   Referral source: Aleta Gaspar MD : 1955    Medical Diagnosis: Left foot pain [M79.672]  Low back pain [M54.5]  Pain in right foot [M79.671]  Payor: BLUE CROSS / Plan:  [facility] / Product Type: PPO /  Onset Date:6-8 months low back; chronic B feet    Treatment Diagnosis: LBP, B foot pain   Prior Hospitalization: see medical history Provider#: [id number]   Medications: Verified on Patient summary List    Comorbidities: diabetes, arthritis, peripheral vascular disease, previous hammer toe reductions of the left foot and removal of a fifth toe phalange in the    Prior Level of Function: Able to perform all ADLs with minimal to no back pain and low level foot pain. The Plan of Care and following information is based on the information from the initial evaluation. Assessment/ key information: Pt is a 67-year-old male presenting to the clinic today with c/o left sided lateral hip pain that radiates to the left side of the low back as well as B foot pain. Back and lateral hip pain began 6-8 months ago insidiously,with pt stating \"my spine feels like it's twisted. \" Foot pain has been ongoing since the . Pt is currently having pain with prolonged standing and sitting and presents with limited lumbar flexion and extension, decreased B LE strength, decreased flexibility of the B LE, decreased B ankle and foot mobility, and decreased ease of transfers, bed mobility, and ambulation. He presents with increased tightness and tenderness to palpation of the left sided QL and lumbar paraspinals and the left ITB. (-) SIJ testing. Decreased thoracolumbar mobility noted in prone.  Signs and symptoms are consistent with mechanical low back pain and foot pain increased by structural weakness and reduced mobility. Mr. Silke Bird will benefit from physical therapy services to address the aforementioned impairments and progress towards goal attainment. Evaluation Complexity History MEDIUM  Complexity : 1-2 comorbidities / personal factors will impact the outcome/ POC ; Examination MEDIUM Complexity : 3 Standardized tests and measures addressing body structure, function, activity limitation and / or participation in recreation  ;Presentation MEDIUM Complexity : Evolving with changing characteristics  ; Clinical Decision Making MEDIUM Complexity : FOTO score of 26-74  Overall Complexity Rating: MEDIUM  Problem List: pain affecting function, decrease ROM, decrease strength, impaired gait/ balance, decrease ADL/ functional abilitiies, decrease activity tolerance, decrease flexibility/ joint mobility and decrease transfer abilities   Treatment Plan may include any combination of the following: Therapeutic exercise, Therapeutic activities, Neuromuscular re-education, Physical agent/modality, Gait/balance training, Manual therapy, Aquatic therapy, Patient education, Self Care training, Functional mobility training, Home safety training and Stair training  Patient / Family readiness to learn indicated by: asking questions, trying to perform skills and interest  Persons(s) to be included in education: patient (P)  Barriers to Learning/Limitations: None  Patient Goal (s): help realign my spine, decrease pain to left side and bilateral feet  Patient Self Reported Health Status: good  Rehabilitation Potential: good    Short Term Goals: To be accomplished in 2 weeks:  1. Pt will report daily compliance with his HEP to maximize therapeutic success. 2. Pt will perform STS from standard 18-21 inch chair with or without UE assist without pain increase to improve ease of completing functional transfers. Long Term Goals: To be accomplished in 4 weeks:  1.  Pt will improve his FOTO score to 67, demonstrating improved functional capacity for ADLs. 2. Pt will improve lumbar extension to 50% or better of WNL to improve ease of performing self care tasks. 3. Pt will improve glute medius and jewell strength B to 4/5 MMT or better to improve ease of prolonged standing and ambulation. 4. Pt will improve B foot strength to 4/5 MMT or better to improve ease of standing and performing STS transfers. 5. Pt will demonstrate negative JEN testing B, indicating improved flexibility and ease for donning and doffing shoes. Frequency / Duration: Patient to be seen 2 times per week for 4 weeks. Patient/ Caregiver education and instruction: Diagnosis, prognosis, self care, activity modification and exercises   [x]  Plan of care has been reviewed with RY Leroy, PT 5/5/2021 6:32 PM    ________________________________________________________________________    I certify that the above Therapy Services are being furnished while the patient is under my care. I agree with the treatment plan and certify that this therapy is necessary.     Physician's Signature:____________Date:_________TIME:________     Paz Nagy MD  ** Signature, Date and Time must be completed for valid certification **    Please sign and return to [facility] [address]  [address]  [place], [address]  [phone] fax

## 2021-05-17 ENCOUNTER — APPOINTMENT (OUTPATIENT)
Dept: PHYSICAL THERAPY | Age: 66
End: 2021-05-17
Payer: COMMERCIAL

## 2021-05-19 ENCOUNTER — APPOINTMENT (OUTPATIENT)
Dept: PHYSICAL THERAPY | Age: 66
End: 2021-05-19
Payer: COMMERCIAL

## 2021-05-24 ENCOUNTER — HOSPITAL ENCOUNTER (OUTPATIENT)
Dept: PHYSICAL THERAPY | Age: 66
Discharge: HOME | End: 2021-05-24
Payer: COMMERCIAL

## 2021-05-24 PROCEDURE — 97112 NEUROMUSCULAR REEDUCATION: CPT

## 2021-05-24 PROCEDURE — 97110 THERAPEUTIC EXERCISES: CPT

## 2021-05-24 PROCEDURE — 97140 MANUAL THERAPY 1/> REGIONS: CPT

## 2021-05-24 NOTE — PROGRESS NOTES
PT DAILY TREATMENT NOTE     Patient Name: Hussain Camp. Date:2021  : 1955  [x]  Patient  Verified  Payor: BLUE CROSS / [facility] / Product Type: PPO /    In time:5:15  Out time:6:03  Total Treatment Time (min): 48  Visit #: 2 of 8    Medicare/BCBS Only   Total Timed Codes (min):  48 1:1 Treatment Time:  43       Treatment Area: Left foot pain [M79.672]  Low back pain [M54.5]  Pain in right foot [M79.671]    SUBJECTIVE  Pain Level (0-10 scale): 4  Any medication changes, allergies to medications, adverse drug reactions, diagnosis change, or new procedure performed?: [x] No    [] Yes (see summary sheet for update)  Subjective functional status/changes:   [] No changes reported  Pt reports pain in his left side and down into his hip coming into treatment today. OBJECTIVE      32 min Therapeutic Exercise:  [x] See flow sheet :   Rationale: increase ROM and increase strength to improve the patients ability to perform functional task with ease. 8 min Neuromuscular Re-education:  [x]  See flow sheet :   Rationale: increase strength, improve coordination, improve balance and increase proprioception  to improve the patients ability to perform ADL's with ease. 8 min Manual Therapy:  STM/DTM to left QL in right S/L   The manual therapy interventions were performed at a separate and distinct time from the therapeutic activities interventions. Rationale: decrease pain, increase ROM, increase tissue extensibility and decrease trigger points to improve functional mobility with ADL's.           With   [] TE   [] TA   [] neuro   [] other: Patient Education: [x] Review HEP    [] Progressed/Changed HEP based on:   [] positioning   [] body mechanics   [] transfers   [] heat/ice application    [] other:      Other Objective/Functional Measures: initiated therex per flow sheet     Pain Level (0-10 scale) post treatment: 3    ASSESSMENT/Changes in Function:   First f/u session with pt displaying a fair tolerance to therex however reporting increased discomfort in the left hip and left QL with HS stretch that ease upon completion. Pt challenged with SLR x 3 especially noted with prone hip extension requiring cueing to maintain TKE with exercise and neutral hips in all 3 directions. Minor Trp noted in the left QL with pt reporting that's where most of his pain resides however notes decreased discomfort post treatment. HEP reviewed with pt reporting a better understanding noting he attempted the exercises but was not sure if he was performing them correctly. Pt reports decreased tightness and discomfort and left in no apparent distress. Patient will continue to benefit from skilled PT services to modify and progress therapeutic interventions, address functional mobility deficits, address ROM deficits, address strength deficits, analyze and address soft tissue restrictions, analyze and cue movement patterns, analyze and modify body mechanics/ergonomics and assess and modify postural abnormalities to attain remaining goals. [x]  See Plan of Care  []  See progress note/recertification  []  See Discharge Summary         Progress towards goals / Updated goals:  Short Term Goals: To be accomplished in 2 weeks:  1. Pt will report daily compliance with his HEP to maximize therapeutic success. Eval: HEP assigned  Current: Met 5/24/21 Pt reports HEP compliance and understanding after review. 2. Pt will perform STS from standard 18-21 inch chair with or without UE assist without pain increase to improve ease of completing functional transfers. Eval: BUE use with increased pain getting up from pt evaluation chair  Long Term Goals: To be accomplished in 4 weeks:  1. Pt will improve his FOTO score to 67, demonstrating improved functional capacity for ADLs. Eval: 54  2.  Pt will improve lumbar extension to 50% or better of WNL to improve ease of performing self care tasks. Eval: <25% with stiffness and pain  3. Pt will improve glute medius and jewell strength B to 4/5 MMT or better to improve ease of prolonged standing and ambulation. Eval: glute med 4-/5 B, glute max right LE 3+/5 left LE 3-/5  4. Pt will improve B foot strength to 4/5 MMT or better to improve ease of standing and performing STS transfers. Eval: anterior tibialis right 4/5 left 4-/5, peroneals/invertors 4-/5 right 3+/5 left, plantarflexors 4/5 right 3+/5 left  5. Pt will demonstrate negative JEN testing B, indicating improved flexibility and ease for donning and doffing shoes.                Eval: JEN (+) B for left sided LBP    PLAN  []  Upgrade activities as tolerated     [x]  Continue plan of care  []  Update interventions per flow sheet       []  Discharge due to:_  []  Other:_      Brian Knight PTA 5/24/2021  5:26 PM    Future Appointments   Date Time Provider Abrahan Maldonado   5/26/2021  6:00 PM Joanne Oconnell, [place] [facility]   6/2/2021  5:15 PM Jared Fontenot, PT [facility]   6/3/2021  5:15 PM Mitzy Stain, PTA [facility]   6/8/2021  5:15 PM Dania Bautista Mangabhinav, PTA [facility]

## 2021-05-26 ENCOUNTER — HOSPITAL ENCOUNTER (OUTPATIENT)
Dept: PHYSICAL THERAPY | Age: 66
Discharge: HOME | End: 2021-05-26
Payer: COMMERCIAL

## 2021-05-26 PROCEDURE — 97112 NEUROMUSCULAR REEDUCATION: CPT

## 2021-05-26 PROCEDURE — 97110 THERAPEUTIC EXERCISES: CPT

## 2021-05-26 PROCEDURE — 97140 MANUAL THERAPY 1/> REGIONS: CPT

## 2021-05-26 NOTE — PROGRESS NOTES
PT DAILY TREATMENT NOTE     Patient Name: Chi Orozco. Date:2021  : 1955  [x]  Patient  Verified  Payor: BLUE CROSS / Plan: [facility] / Product Type: PPO /    In time:6:01  Out time:6:53  Total Treatment Time (min): 52  Visit #: 3 of 8    Medicare/BCBS Only   Total Timed Codes (min):  52 1:1 Treatment Time:  52       Treatment Area: Left foot pain [M79.672]  Low back pain [M54.5]  Pain in right foot [M79.671]    SUBJECTIVE  Pain Level (0-10 scale): 3/10  Any medication changes, allergies to medications, adverse drug reactions, diagnosis change, or new procedure performed?: [x] No    [] Yes (see summary sheet for update)  Subjective functional status/changes:   [] No changes reported  \"The stretches are helping. \"    OBJECTIVE    34 min Therapeutic Exercise:  [x] See flow sheet :   Rationale: increase ROM and increase strength to improve the patients ability to perform ADL's.    8 min Neuromuscular Re-education:  [x]  See flow sheet : Band-walks, mini-squats, assessed sit to stands. Rationale: increase strength, improve coordination and increase proprioception  to improve the patients ability to perform functional activities. 10 min Manual Therapy:  STM/DTM Left QL, lower T/S paraspinals and L/S paraspinals, STM Left piriformis. Pt prone. (B) Ankle talocrural joint mobs grade III. Ankle PROM PF/DF. Pt reclined. The manual therapy interventions were performed at a separate and distinct time from the therapeutic activities interventions. Rationale: decrease pain, increase ROM, increase tissue extensibility and decrease trigger points to improve ease of performing functional activities. With   [x] TE   [] TA   [] neuro   [] other: Patient Education: [x] Review HEP    [] Progressed/Changed HEP based on:   [] positioning   [] body mechanics   [] transfers   [] heat/ice application    [] other:      Other Objective/Functional Measures:  Added walking t-band per flow sheet, supervised assistance. Cueing to correct squat mechanics, utilized (B) handrail for support. Pain Level (0-10 scale) post treatment: 3/10    ASSESSMENT/Changes in Function: Demonstrates sit to stand from regular chair without UE support, slight increase in low back pain. Pt fully participated in treatment and is motivated. No change in pain level however pt reports a decrease in tension post-treatment. Patient will continue to benefit from skilled PT services to modify and progress therapeutic interventions, address functional mobility deficits, address ROM deficits, address strength deficits, analyze and address soft tissue restrictions and analyze and modify body mechanics/ergonomics to attain remaining goals. [x]  See Plan of Care  []  See progress note/recertification  []  See Discharge Summary         Progress towards goals / Updated goals:  Short Term Goals: To be accomplished in 2 weeks:  1. Pt will report daily compliance with his HEP to maximize therapeutic success.              Eval: HEP assigned  Current: Met 5/24/21 Pt reports HEP compliance and understanding after review.; Performing every other day and the weekends. 5/26/2021  2. Pt will perform STS from standard 18-21 inch chair with or without UE assist without pain increase to improve ease of completing functional transfers.              Eval: BUE use with increased pain getting up from pt evaluation chair   Current: Demonstrates sit to stand from regular chair without UE support, slight increase in low back pain. 5/26/2021  Long Term Goals: To be accomplished in 4 weeks:  1. Pt will improve his FOTO score to 67, demonstrating improved functional capacity for ADLs.             Eval: 54  2. Pt will improve lumbar extension to 50% or better of WNL to improve ease of performing self care tasks.              Eval: <25% with stiffness and pain  3.  Pt will improve glute medius and jewell strength B to 4/5 MMT or better to improve ease of prolonged standing and ambulation.              Eval: glute med 4-/5 B, glute max right LE 3+/5 left LE 3-/5  4. Pt will improve B foot strength to 4/5 MMT or better to improve ease of standing and performing STS transfers.              Eval: anterior tibialis right 4/5 left 4-/5, peroneals/invertors 4-/5 right 3+/5 left, plantarflexors 4/5 right 3+/5 left  5.  Pt will demonstrate negative JEN testing B, indicating improved flexibility and ease for donning and doffing shoes.               Eval: JEN (+) B for left sided LBP    PLAN  []  Upgrade activities as tolerated     [x]  Continue plan of care  []  Update interventions per flow sheet       []  Discharge due to:_  []  Other:_      Anny Purcell, RY 5/26/2021  6:08 PM    Future Appointments   Date Time Provider Abrahan Maldonado   6/2/2021  5:15 PM Marcia Thomas, PT [facility]   6/3/2021  5:15 PM Osvaldo Nair, PTA [facility]   6/8/2021  5:15 PM Mahesh Rojo, PTA [facility]

## 2021-06-02 ENCOUNTER — HOSPITAL ENCOUNTER (OUTPATIENT)
Dept: PHYSICAL THERAPY | Age: 66
Discharge: HOME | End: 2021-06-02
Payer: COMMERCIAL

## 2021-06-02 PROCEDURE — 97112 NEUROMUSCULAR REEDUCATION: CPT

## 2021-06-02 PROCEDURE — 97140 MANUAL THERAPY 1/> REGIONS: CPT

## 2021-06-02 PROCEDURE — 97110 THERAPEUTIC EXERCISES: CPT

## 2021-06-02 NOTE — PROGRESS NOTES
PT DAILY TREATMENT NOTE     Patient Name: Francisca Buckley. Date:2021  : 1955  [x]  Patient  Verified  Payor: BLUE CROSS / Plan: [address] [place] [address] [place] / Product Type: PPO /    In time:515 (522)  Out time:600  Total Treatment Time (min): 45  Visit #: 4 of 8    Medicare/BCBS Only   Total Timed Codes (min):  45 1:1 Treatment Time:  38       Treatment Area: Left foot pain [M79.672]  Low back pain [M54.5]  Pain in right foot [M79.671]    SUBJECTIVE  Pain Level (0-10 scale): 3  Any medication changes, allergies to medications, adverse drug reactions, diagnosis change, or new procedure performed?: [x] No    [] Yes (see summary sheet for update)  Subjective functional status/changes:   [] No changes reported  Reports continued left flank pain. Better LBP overall. Limited change in foot/ankle pain. OBJECTIVE     22 min Therapeutic Exercise:  [x] See flow sheet :   Rationale: increase ROM and increase strength to improve the patients ability to perform ADL's.     8 min Neuromuscular Re-education:  [x]  See flow sheet : band walks, mini squats   Rationale: increase strength, improve coordination and increase proprioception  to improve the patients ability to perform functional activities. 8 min Manual Therapy:  STM to left QL and lumbar paraspinals as well as left piriformis in prone. In prone performed GR3 ankle DF/PF oscillations. The manual therapy interventions were performed at a separate and distinct time from the therapeutic activities interventions. Rationale: decrease pain, increase ROM, increase tissue extensibility and decrease trigger points to improve ease of performing functional activities.           With   [] TE   [] TA   [] neuro   [] other: Patient Education: [x] Review HEP    [] Progressed/Changed HEP based on:   [] positioning   [] body mechanics   [] transfers   [] heat/ice application    [] other:      Other Objective/Functional Measures: added trapeze bar marches with abdominal draw in     Pain Level (0-10 scale) post treatment: 3    ASSESSMENT/Changes in Function: Pt has minimal change in overall pain today. Advised him to initiate QL stretching throughout the day, adding in a slight forward rotation at times to change the direction of his stretch. Pt returning tomorrow for his fourth follow-up and will continue to benefit from continued therapy with a progress note, working on abdominal strengthening and ankle mobility. Demonstrates much improved lumbar extension today. Patient will continue to benefit from skilled PT services to modify and progress therapeutic interventions, address functional mobility deficits, address ROM deficits, address strength deficits, analyze and address soft tissue restrictions, analyze and cue movement patterns, analyze and modify body mechanics/ergonomics, assess and modify postural abnormalities, address imbalance/dizziness and instruct in home and community integration to attain remaining goals. []  See Plan of Care  []  See progress note/recertification  []  See Discharge Summary         Progress towards goals / Updated goals:  Short Term Goals: To be accomplished in 2 weeks:  1. Pt will report daily compliance with his HEP to maximize therapeutic success.              Eval: HEP assigned  Current: Met 5/24/21 Pt reports HEP compliance and understanding after review.; Performing every other day and the weekends. 5/26/2021  2. Pt will perform STS from standard 18-21 inch chair with or without UE assist without pain increase to improve ease of completing functional transfers.              Eval: BUE use with increased pain getting up from pt evaluation chair              Current: Demonstrates sit to stand from regular chair without UE support, slight increase in low back pain. 5/26/2021  Long Term Goals: To be accomplished in 4 weeks:  1.  Pt will improve his FOTO score to 67, demonstrating improved functional capacity for ADLs.              Eval: 54  2. Pt will improve lumbar extension to 50% or better of WNL to improve ease of performing self care tasks.              Eval: <25% with stiffness and pain    Current: met, 75% without pain increase. (6/2/2021)  3. Pt will improve glute medius and jewell strength B to 4/5 MMT or better to improve ease of prolonged standing and ambulation.              Eval: glute med 4-/5 B, glute max right LE 3+/5 left LE 3-/5  4. Pt will improve B foot strength to 4/5 MMT or better to improve ease of standing and performing STS transfers.              Eval: anterior tibialis right 4/5 left 4-/5, peroneals/invertors 4-/5 right 3+/5 left, plantarflexors 4/5 right 3+/5 left  5.  Pt will demonstrate negative JEN testing B, indicating improved flexibility and ease for donning and doffing shoes.               Eval: JEN (+) B for left sided LBP   Current: slow progress, (+) left side for left sided LBP, (-) right (6/2/2021)    PLAN  []  Upgrade activities as tolerated     [x]  Continue plan of care  []  Update interventions per flow sheet       []  Discharge due to:_  []  Other:_      Soniya Plaza, PT 6/2/2021  5:30 PM    Future Appointments   Date Time Provider Abrahan Maldonado   6/3/2021  5:15 PM [facility]   6/8/2021  5:15 PM [facility]jo[facility]

## 2021-06-03 ENCOUNTER — HOSPITAL ENCOUNTER (OUTPATIENT)
Dept: PHYSICAL THERAPY | Age: 66
Discharge: HOME | End: 2021-06-03
Payer: COMMERCIAL

## 2021-06-03 PROCEDURE — 97112 NEUROMUSCULAR REEDUCATION: CPT

## 2021-06-03 PROCEDURE — 97110 THERAPEUTIC EXERCISES: CPT

## 2021-06-03 PROCEDURE — 97140 MANUAL THERAPY 1/> REGIONS: CPT

## 2021-06-03 NOTE — PROGRESS NOTES
PT DAILY TREATMENT NOTE     Patient Name: Godfrey Clemente. Date:6/3/2021  : 1955  [x]  Patient  Verified  Payor: BLUE CROSS / Plan: [employer] [facility] / Product Type: PPO /    In time:5:15  Out time:6:02  Total Treatment Time (min): 48  Visit #: 5 of 8    Medicare/BCBS Only   Total Timed Codes (min):  48 1:1 Treatment Time:  48       Treatment Area: Left foot pain [M79.672]  Low back pain [M54.5]  Pain in right foot [M79.671]    SUBJECTIVE  Pain Level (0-10 scale): 2/10  Any medication changes, allergies to medications, adverse drug reactions, diagnosis change, or new procedure performed?: [x] No    [] Yes (see summary sheet for update)  Subjective functional status/changes:   [] No changes reported  Pt reports no new complaints of pain. Pt reports compliance with HEP. OBJECTIVE    30 min Therapeutic Exercise:  [x] See flow sheet :   Rationale: increase ROM and increase strength to improve the patients ability to perform ADLs with increased ease. 10 min Neuromuscular Re-education:  [x]  See flow sheet :   Rationale: increase strength, improve coordination and increase proprioception  to improve the patients ability to perform ADLs with increased ease. 8 min Manual Therapy:  DTM to left QL and lumbar paraspinals with patient in prone; TCJ mobs to increase DF and PROM ankle DF with patient in supine. The manual therapy interventions were performed at a separate and distinct time from the therapeutic activities interventions. Rationale: decrease pain, increase ROM and increase tissue extensibility to improve tolerance to ADLs. With   [] TE   [] TA   [] neuro   [] other: Patient Education: [x] Review HEP    [] Progressed/Changed HEP based on:   [] positioning   [] body mechanics   [] transfers   [] heat/ice application    [] other:      Other Objective/Functional Measures: FOTO: 58     Pain Level (0-10 scale) post treatment: 4/10    ASSESSMENT/Changes in Function: Pt continues to have difficulty log rolling and performing supine to sit and sit to stand transfers due to pain. Patient will continue to benefit from skilled PT services to modify and progress therapeutic interventions, address functional mobility deficits, address ROM deficits, address strength deficits and analyze and address soft tissue restrictions to attain remaining goals. []  See Plan of Care  []  See progress note/recertification  []  See Discharge Summary         Progress towards goals / Updated goals:  Short Term Goals: To be accomplished in 2 weeks:  1. Pt will report daily compliance with his HEP to maximize therapeutic success.              Eval: HEP assigned  Current: Met 5/24/21 Pt reports HEP compliance and understanding after review.; Performing every other day and the weekends. 5/26/2021  2. Pt will perform STS from standard 18-21 inch chair with or without UE assist without pain increase to improve ease of completing functional transfers.              Eval: BUE use with increased pain getting up from pt evaluation chair              Current: Demonstrates sit to stand from regular chair without UE support, slight increase in low back pain. 5/26/2021  Long Term Goals: To be accomplished in 4 weeks:  1. Pt will improve his FOTO score to 67, demonstrating improved functional capacity for ADLs.             OGMR: 60   Current: progressed to 58. 06/03/2021  2. Pt will improve lumbar extension to 50% or better of WNL to improve ease of performing self care tasks.              Eval: <25% with stiffness and pain               Current: met, 75% without pain increase. (6/2/2021)  3. Pt will improve glute medius and jewell strength B to 4/5 MMT or better to improve ease of prolonged standing and ambulation.              Eval: glute med 4-/5 B, glute max right LE 3+/5 left LE 3-/5    Current: no change. 06/03/2021  4.  Pt will improve B foot strength to 4/5 MMT or better to improve ease of standing and performing STS transfers.              Eval: anterior tibialis right 4/5 left 4-/5, peroneals/invertors 4-/5 right 3+/5 left, plantarflexors 4/5 right 3+/5 left   Current: No change 06/03/2021  5.  Pt will demonstrate negative JEN testing B, indicating improved flexibility and ease for donning and doffing shoes.               Eval: JEN (+) B for left sided LBP              Current: slow progress, (+) left side for left sided LBP, (-) right (6/2/2021)    PLAN  []  Upgrade activities as tolerated     [x]  Continue plan of care  []  Update interventions per flow sheet       []  Discharge due to:_  []  Other:_      Jennifer Nettles PTA 6/3/2021  5:17 PM    Future Appointments   Date Time Provider Abrahan Maldonado   6/8/2021  5:15 PM Asmita Millan PTA [facility]

## 2021-06-03 NOTE — PROGRESS NOTES
[facility]  [address] [address] [place] [address]  [place], [address]  [phone] fax    Physical Therapy Progress Note  Patient name: Faustine Dakin. Start of Care: 2021   Referral source: Ashli Rosas MD : 1955                Medical Diagnosis: Left foot pain [M79.672]  Low back pain [M54.5]  Pain in right foot [M79.671]  Payor: BLUE CROSS / Plan: [employer] [facility] / Product Type: PPO /  Onset Date:6-8 months low back; chronic B feet                Treatment Diagnosis: LBP, B foot pain   Prior Hospitalization: see medical history Provider#: [id number]   Medications: Verified on Patient summary List    Comorbidities: diabetes, arthritis, peripheral vascular disease, previous hammer toe reductions of the left foot and removal of a fifth toe phalange in the    Prior Level of Function: Able to perform all ADLs with minimal to no back pain and low level foot pain. Visits from Start of Care: 5    Missed Visits: 2      Key Functional Changes:   Short Term Goals: To be accomplished in 2 weeks:  1. Pt will report daily compliance with his HEP to maximize therapeutic success.              Eval: HEP assigned  Current: Met  Pt reports HEP compliance and understanding after review.; Performing every other day and the weekends. 2. Pt will perform STS from standard 18-21 inch chair with or without UE assist without pain increase to improve ease of completing functional transfers.              Eval: BUE use with increased pain getting up from pt evaluation chair              Current: Demonstrates sit to stand from regular chair without UE support, slight increase in low back pain.   Long Term Goals: To be accomplished in 4 weeks:  1. Pt will improve his FOTO score to 67, demonstrating improved functional capacity for ADLs.               CWBJ: 35              Current: progressed to 58.   2. Pt will improve lumbar extension to 50% or better of WNL to improve ease of performing self care tasks.              Eval: <25% with stiffness and pain               Current: met, 75% without pain increase.   3. Pt will improve glute medius and jewell strength B to 4/5 MMT or better to improve ease of prolonged standing and ambulation.              Eval: glute med 4-/5 B, glute max right LE 3+/5 left LE 3-/5               Current: no change. 4. Pt will improve B foot strength to 4/5 MMT or better to improve ease of standing and performing STS transfers.              Eval: anterior tibialis right 4/5 left 4-/5, peroneals/invertors 4-/5 right 3+/5 left, plantarflexors 4/5 right 3+/5 left              Current: No change   5. Pt will demonstrate negative JEN testing B, indicating improved flexibility and ease for donning and doffing shoes.               Eval: JEN (+) B for left sided LBP              Current: slow progress, (+) left side for left sided LBP, (-) right     Updated Goals: to be achieved in 4 weeks:   1. Pt will perform STS from standard 18-21 inch chair with or without UE assist without pain increase to improve ease of completing functional transfers.              PN: Demonstrates sit to stand from regular chair without UE support, slight increase in low back pain.   2. Pt will improve his FOTO score to 67, demonstrating improved functional capacity for ADLs.             NA: progressed to 62. 3. Pt will improve glute medius and jewell strength B to 4/5 MMT or better to improve ease of prolonged standing and ambulation.             PN: glute med 4-/5 B, glute max right LE 3+/5 left LE 3-/5            4. Pt will improve B foot strength to 4/5 MMT or better to improve ease of standing and performing STS transfers.             PN: anterior tibialis right 4/5 left 4-/5, peroneals/invertors 4-/5 right 3+/5 left, plantarflexors 4/5 right 3+/5 left       5.  Pt will demonstrate negative JEN testing B, indicating improved flexibility and ease for donning and doffing shoes.               LR: slow progress, (+) left side for left sided LBP, (-) right     ASSESSMENT/RECOMMENDATIONS: Pt demonstrates some progress progress toward goals with increased tolerance to transfers and improve AROM lumbar extension but patient continues to have left sided flank pain and continued bilateral LE weakness. Pt will benefit from continued PT to address aforementioned limitations. [x]Continue therapy per initial plan/protocol at a frequency of  2 x per week for 4 weeks  []Continue therapy with the following recommended changes:_____________________      _____________________________________________________________________  []Discontinue therapy progressing towards or have reached established goals  []Discontinue therapy due to lack of appreciable progress towards goals  []Discontinue therapy due to lack of attendance or compliance  []Await Physician's recommendations/decisions regarding therapy  []Other:________________________________________________________________    Thank you for this referral.    Elier Griffith, PTA 6/3/2021 6:56 PM  Gomez Kong DPT 6/9/2021  NOTE TO PHYSICIAN:  PLEASE COMPLETE THE ORDERS BELOW AND   FAX TO [facility]: [phone]  If you are unable to process this request in 24 hours please contact our office: [phone]    ? I have read the above report and request that my patient continue as recommended. ? I have read the above report and request that my patient continue therapy with the following changes/special instructions:__________________________________________________________  ? I have read the above report and request that my patient be discharged from therapy.     Physicians signature: ______________________________Date: ______Time:______     Sarath Randhawa MD

## 2021-06-08 ENCOUNTER — HOSPITAL ENCOUNTER (OUTPATIENT)
Dept: PHYSICAL THERAPY | Age: 66
Discharge: HOME | End: 2021-06-08
Payer: COMMERCIAL

## 2021-06-08 PROCEDURE — 97140 MANUAL THERAPY 1/> REGIONS: CPT

## 2021-06-08 PROCEDURE — 97110 THERAPEUTIC EXERCISES: CPT

## 2021-06-08 NOTE — PROGRESS NOTES
PT DAILY TREATMENT NOTE     Patient Name: Raegan Drew. Date:2021  : 1955  [x]  Patient  Verified  Payor: BLUE CROSS / Plan: [facility] [place] / Product Type: PPO /    In time:5:23  Out time:5:59  Total Treatment Time (min): 36  Visit #: 1 of 8    Medicare/BCBS Only   Total Timed Codes (min):  36 1:1 Treatment Time:  36       Treatment Area: Left foot pain [M79.672]  Low back pain [M54.5]  Pain in right foot [M79.671]    SUBJECTIVE  Pain Level (0-10 scale): 3/10  Any medication changes, allergies to medications, adverse drug reactions, diagnosis change, or new procedure performed?: [x] No    [] Yes (see summary sheet for update)  Subjective functional status/changes:   [] No changes reported  Pt late for appointment secondary to traffic per pt. OBJECTIVE    18 min Therapeutic Exercise:  [x] See flow sheet :   Rationale: increase ROM and increase strength to improve the patients ability to perform ADL's. 10 min Neuromuscular Re-education:  [x]  See flow sheet : Trapeze series, band-walks,    Rationale: increase strength, improve coordination and increase proprioception  to improve the patients ability to perform functional activities. 8 min Manual Therapy:  STM/DTM Left QL, L/S paraspinals, glute med and piriformis. The manual therapy interventions were performed at a separate and distinct time from the therapeutic activities interventions. Rationale: decrease pain, increase ROM, increase tissue extensibility and decrease trigger points to improve ease of performing functional activities. With   [x] TE   [] TA   [] neuro   [] other: Patient Education: [x] Review HEP    [] Progressed/Changed HEP based on:   [] positioning   [] body mechanics   [] transfers   [] heat/ice application    [] other:      Other Objective/Functional Measures: Occasional cueing to correct exercise mechanics and to maintain TA recruitment. Added yvrose stretch for 60\" (B).       Pain Level (0-10 scale) post treatment: 2/10    ASSESSMENT/Changes in Function: Poor (B) HS flexibility. Pt reports feeling relief with PT and with HEP however pain does return. Demonstrates good TA with hook-lying exercises, challenged recruitment while standing. Pt expressed a slight decrease in pain level post-treatment. Patient will continue to benefit from skilled PT services to modify and progress therapeutic interventions, address functional mobility deficits, address ROM deficits, address strength deficits, analyze and address soft tissue restrictions and analyze and modify body mechanics/ergonomics to attain remaining goals. [x]  See Plan of Care  []  See progress note/recertification  []  See Discharge Summary         Progress towards goals / Updated goals:  Updated Goals: to be achieved in 4 weeks:              1. Pt will perform STS from standard 18-21 inch chair with or without UE assist without pain increase to improve ease of completing functional transfers.              PN: Demonstrates sit to stand from regular chair without UE support, slight increase in low back pain.   2. Pt will improve his FOTO score to 67, demonstrating improved functional capacity for ADLs.             XO: progressed to 62. 3. Pt will improve glute medius and jewell strength B to 4/5 MMT or better to improve ease of prolonged standing and ambulation.             PN: glute med 4-/5 B, glute max right LE 3+/5 left LE 3-/5            4. Pt will improve B foot strength to 4/5 MMT or better to improve ease of standing and performing STS transfers.             PN: anterior tibialis right 4/5 left 4-/5, peroneals/invertors 4-/5 right 3+/5 left, plantarflexors 4/5 right 3+/5 left       5.  Pt will demonstrate negative JEN testing B, indicating improved flexibility and ease for donning and doffing shoes.              KQ: slow progress, (+) left side for left sided LBP, (-) right     PLAN  []  Upgrade activities as tolerated     [x] Continue plan of care  []  Update interventions per flow sheet       []  Discharge due to:_  []  Other:_      Keri Osullivan, PTA 6/8/2021  5:25 PM    No future appointments.

## 2021-06-21 ENCOUNTER — HOSPITAL ENCOUNTER (OUTPATIENT)
Dept: PHYSICAL THERAPY | Age: 66
Discharge: HOME | End: 2021-06-21
Payer: COMMERCIAL

## 2021-06-21 PROCEDURE — 97140 MANUAL THERAPY 1/> REGIONS: CPT

## 2021-06-21 PROCEDURE — 97112 NEUROMUSCULAR REEDUCATION: CPT

## 2021-06-21 PROCEDURE — 97110 THERAPEUTIC EXERCISES: CPT

## 2021-06-21 NOTE — PROGRESS NOTES
PT DAILY TREATMENT NOTE     Patient Name: Bettina Alan. Date:2021  : 1955  [x]  Patient  Verified  Payor: BLUE CROSS / Plan: [facility] [place] / Product Type: PPO /    In time:5:17  Out time:5:57  Total Treatment Time (min): 40  Visit #: 2 of 8    Medicare/BCBS Only   Total Timed Codes (min):  40 1:1 Treatment Time:  38       Treatment Area: Left foot pain [M79.672]  Low back pain [M54.5]  Pain in right foot [M79.671]    SUBJECTIVE  Pain Level (0-10 scale): 2  Any medication changes, allergies to medications, adverse drug reactions, diagnosis change, or new procedure performed?: [x] No    [] Yes (see summary sheet for update)  Subjective functional status/changes:   [] No changes reported  Pt reports he just got his toe nail removed and it was bleeding earlier through his shoe which presented a large blood spot on the left side of the patients shoe. Pt reports he has his toe bandaged and wrapped and feels no increasing pain in his toe. Pt reports he would like to take it easy on the standing exercises today. OBJECTIVE    Modality rationale: decrease pain and increase tissue extensibility to improve the patients ability to perform daily task with ease.    Min Type Additional Details    [] Estim:  []Unatt       []IFC  []Premod                        []Other:  []w/ice   []w/heat  Position:  Location:    [] Estim: []Att    []TENS instruct  []NMES                    []Other:  []w/US   []w/ice   []w/heat  Position:  Location:    []  Traction: [] Cervical       []Lumbar                       [] Prone          []Supine                       []Intermittent   []Continuous Lbs:  [] before manual  [] after manual    []  Ultrasound: []Continuous   [] Pulsed                           []1MHz   []3MHz W/cm2:  Location:    []  Iontophoresis with dexamethasone         Location: [] Take home patch   [] In clinic   5 []  Ice     [x]  heat  []  Ice massage  []  Laser   []  Anodyne Position:right S/L  Location: left QL    []  Laser with stim  []  Other:  Position:  Location:    []  Vasopneumatic Device    []  Right     []  Left  Pre-treatment girth:  Post-treatment girth:  Measured at (location):  Pressure:       [] lo [] med [] hi   Temperature: [] lo [] med [] hi   [] Skin assessment post-treatment:  []intact []redness- no adverse reaction    []redness  adverse reaction:     17 min Therapeutic Exercise:  [x] See flow sheet :   Rationale: increase ROM and increase strength to improve the patients ability to perform functional task with ease. 10 min Neuromuscular Re-education:  [x]  See flow sheet :   Rationale: increase strength, improve coordination, improve balance and increase proprioception  to improve the patients ability to perform ADL's.    8 min Manual Therapy:  STM/DTM Left QL, L/S paraspinals, glute med and piriformis. The manual therapy interventions were performed at a separate and distinct time from the therapeutic activities interventions. Rationale: decrease pain, increase ROM, increase tissue extensibility and decrease trigger points to improve functional mobility with ADL's with ease. With   [] TE   [] TA   [] neuro   [] other: Patient Education: [x] Review HEP    [] Progressed/Changed HEP based on:   [] positioning   [] body mechanics   [] transfers   [] heat/ice application    [] other:      Other Objective/Functional Measures: WB'ing exercises held due to pt having toenail removed on left foot. Pain Level (0-10 scale) post treatment: 2    ASSESSMENT/Changes in Function:   [address] exercises held this visit due pt having a toe nail removed on his left foot and stating he cannot do to many things that require him to push through his left foot. Pt presents with a patch of blood on the left side of his shoe but states his toenail is bandage and wrapped.  Exercises performed on the plinth today with pt reporting no increasing pain in the left foot and L/S as well as no growth in the blood stain on the side of his left shoe. Reassess left foot NV and reinitiate WB'ing exercises per pt tolerance. No change in pain post treatment. Patient will continue to benefit from skilled PT services to modify and progress therapeutic interventions, address functional mobility deficits, address ROM deficits, address strength deficits, analyze and address soft tissue restrictions, analyze and cue movement patterns, analyze and modify body mechanics/ergonomics and assess and modify postural abnormalities to attain remaining goals. [x]  See Plan of Care  []  See progress note/recertification  []  See Discharge Summary         Progress towards goals / Updated goals:  Updated Goals: to be achieved in 4 weeks:              1. Pt will perform STS from standard 18-21 inch chair with or without UE assist without pain increase to improve ease of completing functional transfers.              PN: Demonstrates sit to stand from regular chair without UE support, slight increase in low back pain.   2. Pt will improve his FOTO score to 67, demonstrating improved functional capacity for ADLs.             PN: progressed to 58. 3. Pt will improve glute medius and jewell strength B to 4/5 MMT or better to improve ease of prolonged standing and ambulation.             PN: glute med 4-/5 B, glute max right LE 3+/5 left LE 3-/5            4. Pt will improve B foot strength to 4/5 MMT or better to improve ease of standing and performing STS transfers.             PN: anterior tibialis right 4/5 left 4-/5, peroneals/invertors 4-/5 right 3+/5 left, plantarflexors 4/5 right 3+/5 left       5.  Pt will demonstrate negative JEN testing B, indicating improved flexibility and ease for donning and doffing shoes.              PH: slow progress, (+) left side for left sided LBP, (-) right     PLAN  []  Upgrade activities as tolerated     [x]  Continue plan of care  []  Update interventions per flow sheet       [] Discharge due to:_  []  Other:_      Brian Knight, PTA 6/21/2021  5:15 PM    Future Appointments   Date Time Provider Abrahan Maldonado   6/25/2021  6:00 PM Rodney Bentley [place] [facility]   6/28/2021  6:00 PM Mitzy Stain, PTA [facility]   6/30/2021  6:00 PM Jared Fontenot, PT [facility]   7/6/2021  5:15 PM Joanne Oconnell, PTA [facility]   7/8/2021  5:15 PM Mitzy Stain, PTA [facility]

## 2021-06-25 ENCOUNTER — HOSPITAL ENCOUNTER (OUTPATIENT)
Dept: PHYSICAL THERAPY | Age: 66
Discharge: HOME | End: 2021-06-25
Payer: COMMERCIAL

## 2021-06-25 PROCEDURE — 97140 MANUAL THERAPY 1/> REGIONS: CPT

## 2021-06-25 PROCEDURE — 97110 THERAPEUTIC EXERCISES: CPT

## 2021-06-25 NOTE — PROGRESS NOTES
PT DAILY TREATMENT NOTE     Patient Name: Faustine Dakin. Date:2021  : 1955  [x]  Patient  Verified  Payor: BLUE CROSS / Plan:  [facility] [place] / Product Type: PPO /    In time: 6:00 Out time:6:42  Total Treatment Time (min): 42  Visit #: 3 of 8    Medicare/BCBS Only   Total Timed Codes (min): 42 1:1 Treatment Time:  37       Treatment Area: Left foot pain [M79.672]  Low back pain [M54.5]  Pain in right foot [M79.671]    SUBJECTIVE  Pain Level (0-10 scale): 2  Any medication changes, allergies to medications, adverse drug reactions, diagnosis change, or new procedure performed?: [x] No    [] Yes (see summary sheet for update)  Subjective functional status/changes:   [] No changes reported  Pt reports he is still having some left foot pain from having his toenail removed. OBJECTIVE    Modality rationale: decrease pain and increase tissue extensibility to improve the patients ability to perform daily task with ease.    Min Type Additional Details    [] Estim:  []Unatt       []IFC  []Premod                        []Other:  []w/ice   []w/heat  Position:  Location:    [] Estim: []Att    []TENS instruct  []NMES                    []Other:  []w/US   []w/ice   []w/heat  Position:  Location:    []  Traction: [] Cervical       []Lumbar                       [] Prone          []Supine                       []Intermittent   []Continuous Lbs:  [] before manual  [] after manual    []  Ultrasound: []Continuous   [] Pulsed                           []1MHz   []3MHz W/cm2:  Location:    []  Iontophoresis with dexamethasone         Location: [] Take home patch   [] In clinic   5 []  Ice     [x]  heat  []  Ice massage  []  Laser   []  Anodyne Position:right S/L  Location: left L/S, QL    []  Laser with stim  []  Other:  Position:  Location:    []  Vasopneumatic Device    []  Right     []  Left  Pre-treatment girth:  Post-treatment girth:  Measured at (location):  Pressure:       [] lo [] med [] hi Temperature: [] lo [] med [] hi   [] Skin assessment post-treatment:  []intact []redness- no adverse reaction    []redness  adverse reaction:         29 min Therapeutic Exercise:  [x] See flow sheet :   Rationale: increase ROM and increase strength to improve the patients ability to perform functional task with ease. 8 min Manual Therapy:  STM/DTM Left QL, L/S paraspinals, glute med and piriformis. The manual therapy interventions were performed at a separate and distinct time from the therapeutic activities interventions. Rationale: decrease pain, increase ROM and increase tissue extensibility to perform functional task with ease. With   [] TE   [] TA   [] neuro   [] other: Patient Education: [x] Review HEP    [] Progressed/Changed HEP based on:   [] positioning   [] body mechanics   [] transfers   [] heat/ice application    [] other:      Other Objective/Functional Measures: Continued hold on WB'ing exercises due to increased left foot pain from toenail removal.     Pain Level (0-10 scale) post treatment: 1-2    ASSESSMENT/Changes in Function:   Continued hold on WB 'ing exercises due to pt reporting continued increased pain in the left foot from a previous toenail removal. Progressed reps and weights to plinth exercises with pt able to perform as prescribed with no increasing pain. Verbal cueing required for core engagement and control with exercises. Minor mm restrictions noted in the left L/S and QL with pt reporting a slight decrease in pain post treatment. Patient will continue to benefit from skilled PT services to modify and progress therapeutic interventions, address functional mobility deficits, address ROM deficits, address strength deficits, analyze and address soft tissue restrictions, analyze and cue movement patterns, analyze and modify body mechanics/ergonomics and assess and modify postural abnormalities to attain remaining goals.      [x]  See Plan of Care  []  See progress note/recertification  []  See Discharge Summary         Progress towards goals / Updated goals:  Updated Goals: to be achieved in 4 weeks:              1. Pt will perform STS from standard 18-21 inch chair with or without UE assist without pain increase to improve ease of completing functional transfers.              PN: Demonstrates sit to stand from regular chair without UE support, slight increase in low back pain.   2. Pt will improve his FOTO score to 67, demonstrating improved functional capacity for ADLs.             PN: progressed to 58. 3. Pt will improve glute medius and jewell strength B to 4/5 MMT or better to improve ease of prolonged standing and ambulation.             PN: glute med 4-/5 B, glute max right LE 3+/5 left LE 3-/5            4. Pt will improve B foot strength to 4/5 MMT or better to improve ease of standing and performing STS transfers.             PN: anterior tibialis right 4/5 left 4-/5, peroneals/invertors 4-/5 right 3+/5 left, plantarflexors 4/5 right 3+/5 left       5.  Pt will demonstrate negative JEN testing B, indicating improved flexibility and ease for donning and doffing shoes.              MT: slow progress, (+) left side for left sided LBP, (-) right   PLAN  []  Upgrade activities as tolerated     [x]  Continue plan of care  []  Update interventions per flow sheet       []  Discharge due to:_  []  Other:_      Tsering Tompkins PTA 6/25/2021  6:05 PM    Future Appointments   Date Time Provider Abrahan Maldonado   6/28/2021  6:00 PM Dru Irby, PTA [facility]   6/30/2021  6:00 PM Latanya Sauer, PT [facility]   7/6/2021  5:15 PM Natalie Healy, PTA [facility]   7/8/2021  5:15 PM Dru Irby, PTA [facility]

## 2021-06-28 ENCOUNTER — HOSPITAL ENCOUNTER (OUTPATIENT)
Dept: PHYSICAL THERAPY | Age: 66
Discharge: HOME | End: 2021-06-28
Payer: COMMERCIAL

## 2021-06-28 PROCEDURE — 97112 NEUROMUSCULAR REEDUCATION: CPT

## 2021-06-28 PROCEDURE — 97140 MANUAL THERAPY 1/> REGIONS: CPT

## 2021-06-28 PROCEDURE — 97110 THERAPEUTIC EXERCISES: CPT

## 2021-06-28 NOTE — PROGRESS NOTES
PT DAILY TREATMENT NOTE     Patient Name: Rashmi Arriola. Date:2021  : 1955  [x]  Patient  Verified  Payor: BLUE CROSS / Plan: [employer] [facility] [place] / Product Type: PPO /    In time:5:57  Out time:6:41  Total Treatment Time (min): 44  Visit #: 4 of 8    Medicare/BCBS Only   Total Timed Codes (min):  44 1:1 Treatment Time:  44       Treatment Area: Left foot pain [M79.672]  Low back pain [M54.5]  Pain in right foot [M79.671]    SUBJECTIVE  Pain Level (0-10 scale): 2/10  Any medication changes, allergies to medications, adverse drug reactions, diagnosis change, or new procedure performed?: [x] No    [] Yes (see summary sheet for update)  Subjective functional status/changes:   [] No changes reported  Pt reports compliance with ADLs. OBJECTIVE    26 min Therapeutic Exercise:  [x] See flow sheet :   Rationale: increase ROM and increase strength to improve the patients ability to perform ADLs with increased ease. 10 min Neuromuscular Re-education:  [x]  See flow sheet :   Rationale: increase strength, improve coordination and increase proprioception  to improve the patients ability to perform ADLs with increased ease. 8 min Manual Therapy:  DTM to bilateral glutes, piriformis, QL, lumbar paraspinals   The manual therapy interventions were performed at a separate and distinct time from the therapeutic activities interventions. Rationale: decrease pain, increase ROM and increase tissue extensibility to perform ADLs with increased ease. With   [] TE   [] TA   [] neuro   [] other: Patient Education: [x] Review HEP    [] Progressed/Changed HEP based on:   [] positioning   [] body mechanics   [] transfers   [] heat/ice application    [] other:      Other Objective/Functional Measures: Resumed weight bearing exercises as per flow sheet due to patients reports of improved tolerance to weight bearing activities.       Pain Level (0-10 scale) post treatment: 2/10    ASSESSMENT/Changes in Function: Pt demonstrates good tolerance to weight bearing exercises. Pt has some increased right lumbar pain today but denies need for any modality post treatment. Patient will continue to benefit from skilled PT services to modify and progress therapeutic interventions, address functional mobility deficits, address ROM deficits, address strength deficits, analyze and address soft tissue restrictions, analyze and cue movement patterns and analyze and modify body mechanics/ergonomics to attain remaining goals. []  See Plan of Care  []  See progress note/recertification  []  See Discharge Summary         Progress towards goals / Updated goals:  Updated Goals: to be achieved in 4 weeks:              1. Pt will perform STS from standard 18-21 inch chair with or without UE assist without pain increase to improve ease of completing functional transfers.              PN: Demonstrates sit to stand from regular chair without UE support, slight increase in low back pain.    Current: Pt continues to have difficulty performing sit to stand from chair height due to LBP. 06/28/2021  2. Pt will improve his FOTO score to 67, demonstrating improved functional capacity for ADLs.             PN: progressed to 58. 3. Pt will improve glute medius and jewell strength B to 4/5 MMT or better to improve ease of prolonged standing and ambulation.             PN: glute med 4-/5 B, glute max right LE 3+/5 left LE 3-/5            4. Pt will improve B foot strength to 4/5 MMT or better to improve ease of standing and performing STS transfers.             PN: anterior tibialis right 4/5 left 4-/5, peroneals/invertors 4-/5 right 3+/5 left, plantarflexors 4/5 right 3+/5 left       5.  Pt will demonstrate negative JEN testing B, indicating improved flexibility and ease for donning and doffing shoes.              YE: slow progress, (+) left side for left sided LBP, (-) right     PLAN  []  Upgrade activities as tolerated     [x]  Continue plan of care  []  Update interventions per flow sheet       []  Discharge due to:_  []  Other:_      Sagar Frazier, PTA 6/28/2021  6:00 PM    Future Appointments   Date Time Provider Abrahan Maldonado   6/30/2021  6:00 PM Molly Staples, PT [facility]   7/6/2021  5:15 PM Soren Jiménez, PTA [facility]   7/8/2021  5:15 PM Arianna Vieira, PTA [facility] Unknown if ever smoked

## 2021-06-30 ENCOUNTER — APPOINTMENT (OUTPATIENT)
Dept: PHYSICAL THERAPY | Age: 66
End: 2021-06-30
Payer: COMMERCIAL

## 2021-07-06 ENCOUNTER — APPOINTMENT (OUTPATIENT)
Dept: PHYSICAL THERAPY | Age: 66
End: 2021-07-06

## 2021-07-08 ENCOUNTER — APPOINTMENT (OUTPATIENT)
Dept: PHYSICAL THERAPY | Age: 66
End: 2021-07-08

## 2021-07-19 ENCOUNTER — APPOINTMENT (OUTPATIENT)
Dept: PHYSICAL THERAPY | Age: 66
End: 2021-07-19

## 2021-07-21 ENCOUNTER — APPOINTMENT (OUTPATIENT)
Dept: PHYSICAL THERAPY | Age: 66
End: 2021-07-21

## 2021-08-20 NOTE — PROGRESS NOTES
[facility]  [address]  [place], [address]  [phone] fax    Physical Therapy Discharge Summary  Patient name: Chano Villatoro. Start of Care: 2021   Referral source: Donnie Hui MD : 1955   Medical/Treatment Diagnosis: Left foot pain [M79.672]  Low back pain [M54.5]  Pain in right foot [M79.671]  Payor: BLUE CROSS / Plan: [address] [facility] / Product Type: PPO /  Onset Date:6-8mo LBP; chronic B feet     Prior Hospitalization: see medical history Provider#: [id number]   Medications: Verified on Patient Summary List    Comorbidities:diabetes, arthritis, peripheral vascular disease, previous hammer toe reductions of the left foot and removal of a fifth toe phalange in the    Prior Level of Function: Able to perform all ADLs with minimal to no back pain and low level foot pain.   Visits from Start of Care: 9    Missed Visits: 5  Reporting Period : 6/3/2021 to 2021    Summary of Care:  1. Pt will perform STS from standard 18-21 inch chair with or without UE assist without pain increase to improve ease of completing functional transfers.              PN: Demonstrates sit to stand from regular chair without UE support, slight increase in low back pain.               Current: Pt continues to have difficulty performing sit to stand from chair height due to LBP. 2021  2. Pt will improve his FOTO score to 67, demonstrating improved functional capacity for ADLs.             PN: progressed to 58.    3. Pt will improve glute medius and jewell strength B to 4/5 MMT or better to improve ease of prolonged standing and ambulation.             PN: glute med 4-/5 B, glute max right LE 3+/5 left LE 3-/5            4. Pt will improve B foot strength to 4/5 MMT or better to improve ease of standing and performing STS transfers.             PN: anterior tibialis right 4/5 left 4-/5, peroneals/invertors 4-/5 right 3+/5 left, plantarflexors 4/5 right 3+/5 left       5. Pt will demonstrate negative JEN testing B, indicating improved flexibility and ease for donning and doffing shoes.              BL: slow progress, (+) left side for left sided LBP, (-) right        ASSESSMENT/RECOMMENDATIONS:   Pt has not returned to therapy since 6/28/2021 and is being discharged at this time. Unable to reassess goals at this time. D/C without further pt instructions.  Thank you for this referral    [x]Discontinue therapy: []Patient has reached or is progressing toward set goals      [x]Patient is non-compliant or has abdicated      []Due to lack of appreciable progress towards set goals    Alicia Lima, PT 8/20/2021 4:22 PM

## 2023-01-09 ENCOUNTER — TRANSCRIBE ORDER (OUTPATIENT)
Dept: SCHEDULING | Age: 68
End: 2023-01-09

## (undated) DEVICE — BASIN EMESIS 500CC ROSE 250/CS 60/PLT: Brand: MEDEGEN MEDICAL PRODUCTS, LLC

## (undated) DEVICE — SYR 50ML SLIP TIP NSAF LF STRL --

## (undated) DEVICE — KIT COLON W/ 1.1OZ LUB AND 2 END

## (undated) DEVICE — DEVICE INFL 60ML 12ATM CONVENIENT LOK REL HNDL HI PRSS FLX

## (undated) DEVICE — MEDI-VAC NON-CONDUCTIVE SUCTION TUBING: Brand: CARDINAL HEALTH

## (undated) DEVICE — FLEX ADVANTAGE 1500CC: Brand: FLEX ADVANTAGE

## (undated) DEVICE — KENDALL 500 SERIES DIAPHORETIC FOAM MONITORING ELECTRODE - TEAR DROP SHAPE ( 30/PK): Brand: KENDALL